# Patient Record
Sex: FEMALE | Race: WHITE | NOT HISPANIC OR LATINO | Employment: FULL TIME | URBAN - NONMETROPOLITAN AREA
[De-identification: names, ages, dates, MRNs, and addresses within clinical notes are randomized per-mention and may not be internally consistent; named-entity substitution may affect disease eponyms.]

---

## 2018-03-23 ENCOUNTER — HOSPITAL ENCOUNTER (OUTPATIENT)
Dept: PHYSICAL THERAPY | Facility: HOSPITAL | Age: 28
Discharge: HOME | End: 2018-03-23

## 2018-03-23 PROCEDURE — 97799 UNLISTED PHYSCL MED/REHAB PX: CPT

## 2018-06-26 ENCOUNTER — OFFICE VISIT (OUTPATIENT)
Dept: FAMILY MEDICINE CLINIC | Facility: CLINIC | Age: 28
End: 2018-06-26

## 2018-06-26 VITALS
RESPIRATION RATE: 18 BRPM | WEIGHT: 178.8 LBS | HEIGHT: 62 IN | TEMPERATURE: 98.5 F | BODY MASS INDEX: 32.9 KG/M2 | OXYGEN SATURATION: 99 % | HEART RATE: 68 BPM | DIASTOLIC BLOOD PRESSURE: 74 MMHG | SYSTOLIC BLOOD PRESSURE: 120 MMHG

## 2018-06-26 DIAGNOSIS — F41.9 ANXIETY: Primary | ICD-10-CM

## 2018-06-26 DIAGNOSIS — Z11.59 ENCOUNTER FOR HEPATITIS C VIRUS SCREENING TEST FOR HIGH RISK PATIENT: ICD-10-CM

## 2018-06-26 DIAGNOSIS — Z91.89 ENCOUNTER FOR HEPATITIS C VIRUS SCREENING TEST FOR HIGH RISK PATIENT: ICD-10-CM

## 2018-06-26 DIAGNOSIS — E66.9 OBESITY (BMI 30-39.9): ICD-10-CM

## 2018-06-26 DIAGNOSIS — Z00.00 ADULT GENERAL MEDICAL EXAM: ICD-10-CM

## 2018-06-26 DIAGNOSIS — Z13.0 SCREENING FOR DEFICIENCY ANEMIA: ICD-10-CM

## 2018-06-26 DIAGNOSIS — Z13.220 LIPID SCREENING: ICD-10-CM

## 2018-06-26 PROCEDURE — 99203 OFFICE O/P NEW LOW 30 MIN: CPT | Performed by: FAMILY MEDICINE

## 2018-06-26 PROCEDURE — 99385 PREV VISIT NEW AGE 18-39: CPT | Performed by: FAMILY MEDICINE

## 2018-06-26 RX ORDER — PAROXETINE HYDROCHLORIDE 20 MG/1
20 TABLET, FILM COATED ORAL EVERY MORNING
Qty: 90 TABLET | Refills: 1 | Status: SHIPPED | OUTPATIENT
Start: 2018-06-26 | End: 2019-05-10 | Stop reason: SDUPTHER

## 2018-06-26 RX ORDER — PAROXETINE HYDROCHLORIDE 20 MG/1
20 TABLET, FILM COATED ORAL EVERY MORNING
COMMUNITY
End: 2018-06-26 | Stop reason: SDUPTHER

## 2018-06-26 NOTE — PATIENT INSTRUCTIONS
Hepatitis C  Hepatitis C is a viral infection of the liver. It can lead to scarring of the liver (cirrhosis), liver failure, or liver cancer. Hepatitis C may go undetected for months or years because people with the infection may not have symptoms, or they may have only mild symptoms.  What are the causes?  This condition is caused by the hepatitis C virus (HCV). The virus can spread from person to person (is contagious) through:  · Blood.  · Childbirth. A woman who has hepatitis C can pass it to her baby during birth.  · Bodily fluids, such as breast milk, tears, semen, vaginal fluids, and saliva.  · Blood transfusions or organ transplants done in the [place] before 1992.    What increases the risk?  The following factors may make you more likely to develop this condition:  · Having contact with unclean (contaminated) needles or syringes. This may result from:  ? Acupuncture.  ? Tattoing.  ? Body piercing.  ? Injecting drugs.  · Having unprotected sex with someone who is infected.  · Needing treatment to filter your blood (kidney dialysis).  · Having HIV (human immunodeficiency virus) or AIDS (acquired immunodeficiency syndrome).  · Working in a job that involves contact with blood or bodily fluids, such as health care.    What are the signs or symptoms?  Symptoms of this condition include:  · Fatigue.  · Loss of appetite.  · Nausea.  · Vomiting.  · Abdominal pain.  · Dark yellow urine.  · Yellowish skin and eyes (jaundice).  · Itchy skin.  · Raj-colored bowel movements.  · Joint pain.  · Bleeding and bruising easily.  · Fluid building up in your stomach (ascites).    In some cases, you may not have any symptoms.  How is this diagnosed?  This condition is diagnosed with:  · Blood tests.  · Other tests to check how well your liver is functioning. They may include:  ? Magnetic resonance elastography (MRE). This imaging test uses MRIs and sound waves to measure liver stiffness.  ? Transient elastography. This  imaging test uses ultrasounds to measure liver stiffness.  ? Liver biopsy. This test requires taking a small tissue sample from your liver to examine it under a microscope.    How is this treated?  Your health care provider may perform noninvasive tests or a liver biopsy to help decide the best course of treatment. Treatment may include:  · Antiviral medicines and other medicines.  · Follow-up treatments every 6-12 months for infections or other liver conditions.  · Receiving a donated liver (liver transplant).    Follow these instructions at home:  Medicines  · Take over-the-counter and prescription medicines only as told by your health care provider.  · Take your antiviral medicine as told by your health care provider. Do not stop taking the antiviral even if you start to feel better.  · Do not take any medicines unless approved by your health care provider, including over-the-counter medicines and birth control pills.  Activity  · Rest as needed.  · Do not have sex unless approved by your health care provider.  · Ask your health care provider when you may return to school or work.  Eating and drinking  · Eat a balanced diet with plenty of fruits and vegetables, whole grains, and lowfat (lean) meats or non-meat proteins (such as beans or tofu).  · Drink enough fluids to keep your urine clear or pale yellow.  · Do not drink alcohol.  General instructions  · Do not share toothbrushes, nail clippers, or razors.  · Wash your hands frequently with soap and water. If soap and water are not available, use hand .  · Cover any cuts or open sores on your skin to prevent spreading the virus.  · Keep all follow-up visits as told by your health care provider. This is important. You may need follow-up visits every 6-12 months.  How is this prevented?  There is no vaccine for hepatitis C. The only way to prevent the disease is to reduce the risk of exposure to the virus. Make sure you:  · Wash your hands frequently with  soap and water. If soap and water are not available, use hand .  · Do not share needles or syringes.  · Practice safe sex and use condoms.  · Avoid handling blood or bodily fluids without gloves or other protection.  · Avoid getting tattoos or piercings in shops or other locations that are not clean.    Contact a health care provider if:  · You have a fever.  · You develop abdominal pain.  · You pass dark urine.  · You pass lawrence-colored stools.  · You develop joint pain.  Get help right away if:  · You have increasing fatigue or weakness.  · You lose your appetite.  · You cannot eat or drink without vomiting.  · You develop jaundice or your jaundice gets worse.  · You bruise or bleed easily.  Summary  · Hepatitis C is a viral infection of the liver. It can lead to scarring of the liver (cirrhosis), liver failure, or liver cancer.  · The hepatitis C virus (HCV) causes this condition. The virus can pass from person to person (is contagious).  · You should not take any medicines unless approved by your health care provider. This includes over-the-counter medicines and birth control pills.  This information is not intended to replace advice given to you by your health care provider. Make sure you discuss any questions you have with your health care provider.  Document Released: 12/15/2001 Document Revised: 01/23/2018 Document Reviewed: 01/23/2018  [employer] Interactive Patient Education © 2018 [employer]    Exercising to Lose Weight  Exercising can help you to lose weight. In order to lose weight through exercise, you need to do vigorous-intensity exercise. You can tell that you are exercising with vigorous intensity if you are breathing very hard and fast and cannot hold a conversation while exercising.  Moderate-intensity exercise helps to maintain your current weight. You can tell that you are exercising at a moderate level if you have a higher heart rate and faster breathing, but you are still able to  hold a conversation.  How often should I exercise?  Choose an activity that you enjoy and set realistic goals. Your health care provider can help you to make an activity plan that works for you. Exercise regularly as directed by your health care provider. This may include:  · Doing resistance training twice each week, such as:  ? Push-ups.  ? Sit-ups.  ? Lifting weights.  ? Using resistance bands.  · Doing a given intensity of exercise for a given amount of time. Choose from these options:  ? 150 minutes of moderate-intensity exercise every week.  ? 75 minutes of vigorous-intensity exercise every week.  ? A mix of moderate-intensity and vigorous-intensity exercise every week.    Children, pregnant women, people who are out of shape, people who are overweight, and older adults may need to consult a health care provider for individual recommendations. If you have any sort of medical condition, be sure to consult your health care provider before starting a new exercise program.  What are some activities that can help me to lose weight?  · Walking at a rate of at least 4.5 miles an hour.  · Jogging or running at a rate of 5 miles per hour.  · Biking at a rate of at least 10 miles per hour.  · Lap swimming.  · Roller-skating or in-line skating.  · Cross-country skiing.  · Vigorous competitive sports, such as football, basketball, and soccer.  · Jumping rope.  · Aerobic dancing.  How can I be more active in my day-to-day activities?  · Use the stairs instead of the elevator.  · Take a walk during your lunch break.  · If you drive, park your car farther away from work or school.  · If you take public transportation, get off one stop early and walk the rest of the way.  · Make all of your phone calls while standing up and walking around.  · Get up, stretch, and walk around every 30 minutes throughout the day.  What guidelines should I follow while exercising?  · Do not exercise so much that you hurt yourself, feel dizzy, or  get very short of breath.  · Consult your health care provider prior to starting a new exercise program.  · Wear comfortable clothes and shoes with good support.  · Drink plenty of water while you exercise to prevent dehydration or heat stroke. Body water is lost during exercise and must be replaced.  · Work out until you breathe faster and your heart beats faster.  This information is not intended to replace advice given to you by your health care provider. Make sure you discuss any questions you have with your health care provider.  Document Released: 01/20/2012 Document Revised: 05/25/2017 Document Reviewed: 05/21/2015  [employer] Interactive Patient Education © 2018 [employer]  Serving Sizes  A serving size is a measured amount of food or drink, such as one slice of bread, that has an associated nutrient content. Knowing the serving size of a food or drink can help you determine how much of that food you should consume.  What is the size of one serving?  The size of one healthy serving depends on the food or drink. To determine a serving size, read the food label. If the food or drink does not have a food label, try to find serving size information online. Or, use the following to estimate the size of one adult serving:  Grain  1 slice bread. ½ bagel. ½ cup pasta.  Vegetable  ½ cup cooked or canned vegetables. 1 cup raw, leafy greens.  Fruit  ½ cup canned fruit. 1 medium fruit. ¼ cup dried fruit.  Meat and Other Protein Sources  1 oz meat, poultry, or fish. ¼ cup cooked beans. 1 egg. ¼ cup nuts or seeds. 1 Tbsp nut butter. ¼ cup tofu or tempeh. 2 Tbsp hummus.  Dairy  An individual container of yogurt (6-8 oz). 1 piece of cheese the size of your thumb (1 oz). 1 cup (8 oz) milk or milk alternative.  Fat  A piece the size of one dice. 1 tsp soft margarine. 1 Tbsp mayonnaise. 1 tsp vegetable oil. 1 Tbsp regular salad dressing. 2 Tbsp low-fat salad dressing.  How many servings should I eat from each food group each  day?  The following are the suggested number of servings to try and have every day from each food group. You can also look at your eating throughout the week and aim for meeting these requirements on most days for overall healthy eating.  Grain  6-8 servings. Try to have half of your grains from whole grains, such as whole wheat bread, corn tortillas, oatmeal, brown rice, whole wheat pasta, and bulgur.  Vegetable  At least 2½-3 servings.  Fruit  2 servings.  Meat and Other Protein Foods  5-6 servings. Aim to have lean proteins, such as chicken, turkey, fish, beans, or tofu.  Dairy  3 servings. Choose low-fat or nonfat if you are trying to control your weight.  Fat  2-3 servings.  Is a serving the same thing as a portion?  No. A portion is the actual amount you eat, which may be more than one serving. Knowing the specific serving size of a food and the nutritional information that goes with it can help you make a healthy decision on what size portion to eat.  What are some tips to help me learn healthy serving sizes?  · Check food labels for serving sizes. Many foods that come as a single portion actually contain multiple servings.  · Determine the serving size of foods you commonly eat and figure out how large a portion you usually eat.  · Measure the number of servings that can be held by the bowls, glasses, cups, and plates you typically use. For example, pour your breakfast cereal into your regular bowl and then pour it into a measuring cup.  · For 1-2 days, measure the serving sizes of all the foods you eat.  · Practice estimating serving sizes and determining how big your portions should be.  This information is not intended to replace advice given to you by your health care provider. Make sure you discuss any questions you have with your health care provider.  Document Released: 09/15/2004 Document Revised: 08/12/2017 Document Reviewed: 03/17/2015  [employer] Interactive Patient Education © 2018 [employer]

## 2018-06-26 NOTE — PROGRESS NOTES
Subjective cc: est care for anxiety   Aparna MORTENSEN is a 27 y.o. female who present for est care for anxiety.  Patient takes paxil for anxiety for the past 3 years. She reports that it is well controlled with medication. She reports it came on suddenly about 1 year after having her child. Her mother has bad anxiety. Can be triggered by everyday stress. Not having any panic attacks currently when on paxil. She is concerned about the ability to be on the medicaiton if she were to become pregnant.   She quit taking her OCP 1 year ago. They have been trying to get pregnant without success. She is not having regular menstrual cycles.     Patient reports her last pap smear was more than 3 years ago. Patient has followed with Dr Santos but has difficulty getting in for an appt so she would like to just have her pap done here.     Her  does have hep C, they follow with GI later this week to have him further evaluated.       Anxiety   Presents for initial visit. Onset was 1 to 5 years ago. The problem has been unchanged. Symptoms include nervous/anxious behavior and panic. Patient reports no chest pain, nausea, palpitations or shortness of breath. Primary symptoms comment: controleld with medication. Symptoms occur rarely. The severity of symptoms is mild. Exacerbated by: everything      Risk factors include family history. Her past medical history is significant for anxiety/panic attacks. Past treatments include SSRIs. The treatment provided significant relief. Compliance with prior treatments has been good.        The following portions of the patient's history were reviewed and updated as appropriate: allergies, current medications, past family history, past medical history, past social history, past surgical history and problem list.        Review of Systems   Constitutional: Negative for activity change and appetite change. Unexpected weight change: weight gain    Respiratory: Negative for cough, chest  "tightness, shortness of breath and wheezing.    Cardiovascular: Negative for chest pain and palpitations.   Gastrointestinal: Negative for abdominal pain, constipation, diarrhea, nausea and vomiting.   Genitourinary: Positive for menstrual problem.   Psychiatric/Behavioral: Negative for dysphoric mood. The patient is nervous/anxious.        Objective   Blood pressure 120/74, pulse 68, temperature 98.5 °F (36.9 °C), temperature source Oral, resp. rate 18, height 157.5 cm (62\"), weight 81.1 kg (178 lb 12.8 oz), last menstrual period 05/28/2018, SpO2 99 %, not currently breastfeeding.  Physical Exam   Constitutional: She is oriented to person, place, and time. She appears well-developed and well-nourished. No distress.   HENT:   Head: Normocephalic and atraumatic.   Right Ear: External ear normal.   Left Ear: External ear normal.   Nose: Nose normal.   Mouth/Throat: Oropharynx is clear and moist. No oropharyngeal exudate.   Eyes: Conjunctivae and EOM are normal. Right eye exhibits no discharge. Left eye exhibits no discharge. No scleral icterus.   Neck: Normal range of motion. No tracheal deviation present. No thyromegaly present.   Cardiovascular: Normal rate, regular rhythm, normal heart sounds and intact distal pulses.    No murmur heard.  Pulmonary/Chest: Effort normal and breath sounds normal. No stridor. No respiratory distress. She has no wheezes. She exhibits no tenderness.   Abdominal: Soft. She exhibits no distension. There is no tenderness.   Musculoskeletal: She exhibits no edema.   Lymphadenopathy:     She has no cervical adenopathy.   Neurological: She is alert and oriented to person, place, and time. She exhibits normal muscle tone. Coordination normal.   Skin: Skin is warm and dry. She is not diaphoretic.   Psychiatric: She has a normal mood and affect. Her behavior is normal. Judgment and thought content normal.   Nursing note and vitals reviewed.      Assessment/Plan   Problems Addressed this Visit  "    None      Visit Diagnoses     Anxiety    -  Primary    Relevant Medications    PARoxetine (PAXIL) 20 MG tablet    Other Relevant Orders    Comprehensive metabolic panel    TSH    Adult general medical exam        Relevant Orders    Comprehensive metabolic panel    Encounter for hepatitis C virus screening test for high risk patient        Relevant Orders    Comprehensive metabolic panel    Hepatitis C antibody    Screening for deficiency anemia        Relevant Orders    CBC No Differential    Lipid screening        Relevant Orders    Lipid panel    Obesity (BMI 30-39.9)        Relevant Orders    Lipid panel        PLAN:     #1 anxiety: Chronic, controlled.  Patient currently taking Paxil daily.  Refill given on this prescription.  Did discuss with patient that Paxil is contraindicated during pregnancy.  Discussed changing medication versus discussing with her OB/GYN.  Patient will discuss with OB prior to changing medication.  She is hesitant to change medication because it is working so well for her.  Discussed changing to Zoloft.  Patient will allow me know after she has spoke with her OB.    #2 obesity: Patient's Body mass index is 32.7 kg/m². BMI is above normal parameters. Recommendations include: exercise counseling and nutrition counseling.    #3 irregular menstrual cycles: This could be contributing to patient's difficulty becoming pregnant.  Patient has been off of her oral contraceptives for 1 year.  Discussed how weight loss can help to regulate cycles.  Patient to work on changes to her diet and increased exercise over the next one month.  We will reevaluate her weight in 1 month.  Discussed trial of metformin at that time if not improved.    #4 screening: Will get labs today, we will screen for hepatitis C because of the exposure to her  being hep C positive.  Patient to return for Pap smear.  Recommended flu shot in the fall.  Patient has had her hepatitis A and B vaccines.  Patient would  have had her Tdap with her previous pregnancy.          This document has been electronically signed by Cristy Butler MD on June 26, 2018 10:07 AM

## 2018-06-27 LAB
ALBUMIN SERPL-MCNC: 4.3 G/DL (ref 3.5–5)
ALBUMIN/GLOB SERPL: 1.5 G/DL (ref 1.1–2.5)
ALP SERPL-CCNC: 53 U/L (ref 24–120)
ALT SERPL-CCNC: 28 U/L (ref 0–54)
AST SERPL-CCNC: 21 U/L (ref 7–45)
BILIRUB SERPL-MCNC: 0.7 MG/DL (ref 0.1–1)
BUN SERPL-MCNC: 11 MG/DL (ref 5–21)
BUN/CREAT SERPL: 15.3 (ref 7–25)
CALCIUM SERPL-MCNC: 9.7 MG/DL (ref 8.4–10.4)
CHLORIDE SERPL-SCNC: 103 MMOL/L (ref 98–110)
CHOLEST SERPL-MCNC: 173 MG/DL (ref 130–200)
CO2 SERPL-SCNC: 27 MMOL/L (ref 24–31)
CREAT SERPL-MCNC: 0.72 MG/DL (ref 0.5–1.4)
ERYTHROCYTE [DISTWIDTH] IN BLOOD BY AUTOMATED COUNT: 12.9 % (ref 12–15)
GLOBULIN SER CALC-MCNC: 2.9 GM/DL
GLUCOSE SERPL-MCNC: 93 MG/DL (ref 70–100)
HCT VFR BLD AUTO: 37.3 % (ref 37–47)
HCV AB S/CO SERPL IA: 6.3 S/CO RATIO (ref 0–0.9)
HDLC SERPL-MCNC: 55 MG/DL
HGB BLD-MCNC: 12.2 G/DL (ref 12–16)
LDLC SERPL CALC-MCNC: 103 MG/DL (ref 0–99)
MCH RBC QN AUTO: 29.3 PG (ref 28–32)
MCHC RBC AUTO-ENTMCNC: 32.7 G/DL (ref 33–36)
MCV RBC AUTO: 89.7 FL (ref 82–98)
PLATELET # BLD AUTO: 239 10*3/MM3 (ref 130–400)
POTASSIUM SERPL-SCNC: 4.3 MMOL/L (ref 3.5–5.3)
PROT SERPL-MCNC: 7.2 G/DL (ref 6.3–8.7)
RBC # BLD AUTO: 4.16 10*6/MM3 (ref 4.2–5.4)
SODIUM SERPL-SCNC: 141 MMOL/L (ref 135–145)
TRIGL SERPL-MCNC: 77 MG/DL (ref 0–149)
TSH SERPL DL<=0.005 MIU/L-ACNC: 1.69 MIU/ML (ref 0.47–4.68)
VLDLC SERPL CALC-MCNC: 15.4 MG/DL
WBC # BLD AUTO: 6.05 10*3/MM3 (ref 4.8–10.8)

## 2018-06-28 DIAGNOSIS — R76.8 POSITIVE HEPATITIS C ANTIBODY TEST: Primary | ICD-10-CM

## 2018-07-03 ENCOUNTER — RESULTS ENCOUNTER (OUTPATIENT)
Dept: FAMILY MEDICINE CLINIC | Facility: CLINIC | Age: 28
End: 2018-07-03

## 2018-07-03 DIAGNOSIS — R76.8 POSITIVE HEPATITIS C ANTIBODY TEST: ICD-10-CM

## 2019-03-29 ENCOUNTER — OFFICE VISIT (OUTPATIENT)
Dept: FAMILY MEDICINE CLINIC | Facility: CLINIC | Age: 29
End: 2019-03-29

## 2019-03-29 VITALS
BODY MASS INDEX: 31.1 KG/M2 | OXYGEN SATURATION: 99 % | SYSTOLIC BLOOD PRESSURE: 108 MMHG | DIASTOLIC BLOOD PRESSURE: 64 MMHG | WEIGHT: 169 LBS | TEMPERATURE: 98.4 F | HEART RATE: 86 BPM | RESPIRATION RATE: 18 BRPM | HEIGHT: 62 IN

## 2019-03-29 DIAGNOSIS — B34.9 VIRAL SYNDROME: Primary | ICD-10-CM

## 2019-03-29 DIAGNOSIS — R50.9 FEVER, UNSPECIFIED FEVER CAUSE: ICD-10-CM

## 2019-03-29 LAB
EXPIRATION DATE: NORMAL
FLUAV AG NPH QL: NEGATIVE
FLUBV AG NPH QL: NEGATIVE
INTERNAL CONTROL: NORMAL
Lab: NORMAL

## 2019-03-29 PROCEDURE — 87804 INFLUENZA ASSAY W/OPTIC: CPT | Performed by: FAMILY MEDICINE

## 2019-03-29 PROCEDURE — 99213 OFFICE O/P EST LOW 20 MIN: CPT | Performed by: FAMILY MEDICINE

## 2019-03-29 RX ORDER — ONDANSETRON 4 MG/1
4 TABLET, FILM COATED ORAL EVERY 6 HOURS PRN
Qty: 10 TABLET | Refills: 0 | Status: SHIPPED | OUTPATIENT
Start: 2019-03-29 | End: 2019-05-10

## 2019-03-29 NOTE — PROGRESS NOTES
"Subjective cc: fever  Aparna Valenzuela is a 28 y.o. female who presents with complaint of fever, body aches, chills, sore throat and difficulty swallowing, decreased PO intake, nausea, no vomiting, no cough, no diarrhea, headache.      with influenza.     Fever    This is a new problem. The current episode started in the past 7 days. The problem occurs daily. The problem has been unchanged. The maximum temperature noted was 103 to 103.9 F. Associated symptoms include congestion, headaches, muscle aches and nausea. Pertinent negatives include no abdominal pain, chest pain, coughing, diarrhea, rash, sleepiness, vomiting or wheezing. She has tried NSAIDs for the symptoms. The treatment provided no relief.   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        Review of Systems   Constitutional: Positive for activity change, chills, fatigue and fever.   HENT: Positive for congestion, rhinorrhea and trouble swallowing.    Respiratory: Negative for cough, shortness of breath and wheezing.    Cardiovascular: Negative for chest pain.   Gastrointestinal: Positive for nausea. Negative for abdominal pain, blood in stool, constipation, diarrhea and vomiting.   Skin: Negative for rash.   Neurological: Positive for headaches.   All other systems reviewed and are negative.      Objective   Blood pressure 108/64, pulse 86, temperature 98.4 °F (36.9 °C), temperature source Oral, resp. rate 18, height 157.5 cm (62.01\"), weight 76.7 kg (169 lb), SpO2 99 %, not currently breastfeeding.  Physical Exam   Constitutional: She is oriented to person, place, and time. She appears well-developed and well-nourished. She is active and cooperative.  " Non-toxic appearance. She has a sickly appearance. No distress.   HENT:   Head: Normocephalic and atraumatic.   Right Ear: Hearing, tympanic membrane, external ear and ear canal normal.   Left Ear: Hearing, tympanic membrane, external ear and ear canal normal.   Nose: Nose normal. Right sinus exhibits no maxillary sinus tenderness and no frontal sinus tenderness. Left sinus exhibits no maxillary sinus tenderness and no frontal sinus tenderness.   Mouth/Throat: Oropharynx is clear and moist. Mucous membranes are dry. No oropharyngeal exudate.   Eyes: Conjunctivae and EOM are normal. Right eye exhibits no discharge. Left eye exhibits no discharge.   Neck: Normal range of motion. No tracheal deviation present. No thyromegaly present.   Cardiovascular: Normal rate, regular rhythm, normal heart sounds and intact distal pulses.   Pulmonary/Chest: Effort normal and breath sounds normal. No stridor. No respiratory distress. She has no wheezes. She exhibits no tenderness.   Musculoskeletal: She exhibits no edema.   Lymphadenopathy:     She has no cervical adenopathy.   Neurological: She is alert and oriented to person, place, and time. She exhibits normal muscle tone. Coordination normal.   Skin: Skin is warm and dry. She is not diaphoretic.   Psychiatric: She has a normal mood and affect. Her behavior is normal. Judgment and thought content normal.   Nursing note and vitals reviewed.    Lab Results (last 24 hours)     Procedure Component Value Units Date/Time    POCT Influenza A/B [[id number]]  (Normal) Collected:  03/29/19 1615    Specimen:  Swab Updated:  03/29/19 1618     Rapid Influenza A Ag Negative     Rapid Influenza B Ag Negative     Internal Control Passed     Lot Number [id number]     Expiration Date 9,655,259          Assessment/Plan   Problems Addressed this Visit     None      Visit Diagnoses     Viral syndrome    -  Primary    Relevant Medications    ondansetron (ZOFRAN) 4 MG tablet    Fever, unspecified fever  cause        Relevant Orders    POCT Influenza A/B (Completed)          PLAN:     #1 viral: new, flu negative, advise don conservative measures, advised on warning signs, return if not improving           This document has been electronically signed by Cristy Butler MD on March 29, 2019 4:35 PM

## 2019-04-26 DIAGNOSIS — F41.9 ANXIETY: ICD-10-CM

## 2019-04-26 RX ORDER — PAROXETINE HYDROCHLORIDE 20 MG/1
TABLET, FILM COATED ORAL
Qty: 90 TABLET | Refills: 1 | OUTPATIENT
Start: 2019-04-26

## 2019-05-10 ENCOUNTER — OFFICE VISIT (OUTPATIENT)
Dept: FAMILY MEDICINE CLINIC | Facility: CLINIC | Age: 29
End: 2019-05-10

## 2019-05-10 VITALS
BODY MASS INDEX: 30.8 KG/M2 | TEMPERATURE: 98.5 F | DIASTOLIC BLOOD PRESSURE: 71 MMHG | SYSTOLIC BLOOD PRESSURE: 106 MMHG | OXYGEN SATURATION: 99 % | WEIGHT: 167.4 LBS | HEART RATE: 79 BPM | RESPIRATION RATE: 18 BRPM | HEIGHT: 62 IN

## 2019-05-10 DIAGNOSIS — F33.0 MILD EPISODE OF RECURRENT MAJOR DEPRESSIVE DISORDER (HCC): Primary | ICD-10-CM

## 2019-05-10 DIAGNOSIS — F41.9 ANXIETY: ICD-10-CM

## 2019-05-10 PROCEDURE — 99213 OFFICE O/P EST LOW 20 MIN: CPT | Performed by: NURSE PRACTITIONER

## 2019-05-10 RX ORDER — PAROXETINE HYDROCHLORIDE 20 MG/1
20 TABLET, FILM COATED ORAL EVERY MORNING
Qty: 90 TABLET | Refills: 1 | Status: SHIPPED | OUTPATIENT
Start: 2019-05-10 | End: 2020-05-05 | Stop reason: SDUPTHER

## 2019-05-10 NOTE — PROGRESS NOTES
"Chief Complaint   Patient presents with   • Anxiety     Pt is here for followup and medication refills.          Subjective   Aparna Valenzuela is a 28 y.o. female here for follow up for depression and anxiety.  I started her on paxil and she says it is working very well and she loves it.  Denies any suicidal or homicidal ideations.       The following portions of the patient's history were reviewed and updated as appropriate: allergies, current medications, past family history, past medical history, past social history, past surgical history and problem list.      Current Outpatient Medications:   •  PARoxetine (PAXIL) 20 MG tablet, Take 1 tablet by mouth Every Morning., Disp: 90 tablet, Rfl: 1  No Known Allergies  Past Medical History:   Diagnosis Date   • Anxiety      Past Surgical History:   Procedure Laterality Date   • APPENDECTOMY       Family History   Problem Relation Age of Onset   • Hyperlipidemia Mother    • Hypertension Mother    • No Known Problems Father    • No Known Problems Sister    • Hypertension Brother    • Cancer Maternal Grandmother    • Colon cancer Maternal Grandmother    • Heart disease Paternal Grandfather        REVIEW OF SYMPTOMS: (Positives bolded)  All negative today  General:  weight loss, fever, chills, night sweats, fatigue, appetite loss  HEENT:  blurry vision, eye pain, eye discharge, dry eyes, decreased vision  Respiratory: shortness of breath, cough, hemoptysis, wheezing, pleurisy,   Cardiovascular:  chest pain, PND, palpitation, edema, orthopnea, syncope  Gastro: Nausea, vomiting, diarrhea, hematemesis, abdominal pain, constipation  Genito: hematuria, dysuria, glycosuria, hesitancy, frequency, incontinence  Musckelo: Arthralgia, myalgia, muscle weakness, joint swelling, NSAID use  Skin: rash, pruritis, sores, nail changes, skin thickening, change in wart/mole, itching   Neuro:  Migraine, numbness, ataxia, tremor, vertigo, weakness, memory loss  \"All other systems reviewed and " negative, except as listed above.”      OBJECTIVE:  Constitutional:  No acute distress, Consistent with stated age. Oriented x 3, alert Posture. Patient is pleasant and cooperative with the interview and exam.    Integumentary: No rashes, ulcers or lesions. No edema.  Normal skin moisture/turgor. Skin is warm to touch, no increased warmth. Capillary refill is normal bilateral Upper and lower extremity.     Eye: Bilaterally PERRLA, EOMI.  No discharge.  Upper and lower eyelids are normal. Sclera/conjunctiva normal without discharge. Cornea is normal and clear. Lens is normal.  Eyeball appears normal. No ciliary flushing, no conjunctival injection.    ENMT:  Canals  normal without erythema or discharge, no excessive cerumen. Tympanic membranes Grey/pearly, normal light reflex and anatomy. Hearing normal to conversational speech at 2-5 feet. Nares airflow, no discharge. Dentition assessed and discussed appropriate oral care. Tongue normal midline.  no pharyngeal erythema, Uvula midline. No post nasal drip.     CHEST/LUNG:Lungs clear throughout lung fields without rale, rhonchi or wheezes.  Normal effort, no distress, no use of accessory muscles. nontender sternum, ribline.  No abnormal pulsations.      CARDIOVASCULAR:  Regular rate and rhythm. No murmur noted in sitting, supine positions. digital clubbing, cyanosis, edema, increased warmth.  normal, no bruits or abnormal pulsations.      ABDOMEN: normal and no visible pulsations. Normal contour. Bowel sounds normal, no abdominal bruits. Abd soft, non-tender, no rebound tenderness, no rigidity (guarding), no jar tenderness, no masses.  no hepatomegaly, no splenomegaly     Neuropsych: Able to articulate well. Normal speech, normal rate, normal tone, normal use of language, volume and coherence.  Thought- normal with ability to perform basic computations and apply abstract thought/reason. No hallucinations, delusions, obsessions.   Appropriate judgement and  "memory.      /71 (BP Location: Left arm, Patient Position: Sitting, Cuff Size: Adult)   Pulse 79   Temp 98.5 °F (36.9 °C) (Oral)   Resp 18   Ht 157.5 cm (62.01\")   Wt 75.9 kg (167 lb 6.4 oz)   SpO2 99%   Breastfeeding? No   BMI 30.61 kg/m²     ASSESSMENT  Aparna was seen today for anxiety.    Diagnoses and all orders for this visit:    Mild episode of recurrent major depressive disorder (CMS/HCC)    Anxiety  -     PARoxetine (PAXIL) 20 MG tablet; Take 1 tablet by mouth Every Morning.           PHQ-9 Depression Screening  Little interest or pleasure in doing things? 0   Feeling down, depressed, or hopeless? 0   Trouble falling or staying asleep, or sleeping too much? 0   Feeling tired or having little energy? 0   Poor appetite or overeating? 0   Feeling bad about yourself - or that you are a failure or have let yourself or your family down? 0   Trouble concentrating on things, such as reading the newspaper or watching television? 1   Moving or speaking so slowly that other people could have noticed? Or the opposite - being so fidgety or restless that you have been moving around a lot more than usual? 0   Thoughts that you would be better off dead, or of hurting yourself in some way? 0   PHQ-9 Total Score 1   If you checked off any problems, how difficult have these problems made it for you to do your work, take care of things at home, or get along with other people? Not difficult at all       Return in about 6 months (around 11/10/2019) for Recheck depression and anxiety.    Belkis Robles, DNP, APRN-BC   05/10/2019    "

## 2019-05-10 NOTE — PATIENT INSTRUCTIONS
Depression Screening  Depression screening is a tool that your health care provider can use to learn if you have symptoms of depression. Depression is a common condition with many symptoms that are also often found in other conditions. Depression is treatable, but it must first be diagnosed. You may not know that certain feelings, thoughts, and behaviors that you are having can be symptoms of depression. Taking a depression screening test can help you and your health care provider decide if you need more assessment, or if you should be referred to a mental health care provider.  What are the screening tests?  · You may have a physical exam to see if another condition is affecting your mental health. You may have a blood or urine sample taken during the physical exam.  · You may be interviewed using a screening tool that was developed from research, such as one of these:  ? Patient Health Questionnaire (PHQ). This is a set of either 2 or 9 questions. A health care provider who has been trained to score this screening test uses a guide to assess if your symptoms suggest that you may have depression.  ? Hardy Depression Rating Scale (HAM-D). This is a set of either 17 or 24 questions. You may be asked to take it again during or after your treatment, to see if your depression has gotten better.  ? Ruvalcaba Depression Inventory (BDI). This is a set of 21 multiple choice questions. Your health care provider scores your answers to assess:  § Your level of depression, ranging from mild to severe.  § Your response to treatment.  · Your health care provider may talk with you about your daily activities, such as eating, sleeping, work, and recreation, and ask if you have had any changes in activity.  · Your health care provider may ask you to see a mental health specialist, such as a psychiatrist or psychologist, for more evaluation.  Who should be screened for depression?  · All adults, including adults with a family history  of a mental health disorder.  · Adolescents who are 12-18 years old.  · People who are recovering from a myocardial infarction (MI).  · Pregnant women, or women who have given birth.  · People who have a long-term (chronic) illness.  · Anyone who has been diagnosed with another type of a mental health disorder.  · Anyone who has symptoms that could show depression.  What do my results mean?  Your health care provider will review the results of your depression screening, physical exam, and lab tests. Positive screens suggest that you may have depression. Screening is the first step in getting the care that you may need. It is up to you to get your screening results. Ask your health care provider, or the department that is doing your screening tests, when your results will be ready. Talk with your health care provider about your results and diagnosis.  A diagnosis of depression is made using the Diagnostic and Statistical Manual of Mental Disorders (DSM-V). This is a book that lists the number and type of symptoms that must be present for a health care provider to give a specific diagnosis.   Your health care provider may work with you to treat your symptoms of depression, or your health care provider may help you find a mental health provider who can assess, diagnose, and treat your depression.  Get help right away if:  · You have thoughts about hurting yourself or others.  If you ever feel like you may hurt yourself or others, or have thoughts about taking your own life, get help right away. You can go to your nearest emergency department or call:  · Your local emergency services (911 in the [place]  · A suicide crisis helpline, such as the National Suicide Prevention Lifeline at 1-882.913.4679. This is open 24 hours a day.    Summary  · Depression screening is the first step in getting the help that you may need.  · If your screening test shows symptoms of depression (is positive), your health care provider may ask  you to see a mental health provider.  · Anyone who is age 12 or older should be screened for depression.  This information is not intended to replace advice given to you by your health care provider. Make sure you discuss any questions you have with your health care provider.  Document Released: 05/04/2018 Document Revised: 05/04/2018 Document Reviewed: 05/04/2018  E[employer]! Interactive Patient Education © 2019 [employer]

## 2019-05-16 ENCOUNTER — TELEPHONE (OUTPATIENT)
Dept: FAMILY MEDICINE CLINIC | Facility: CLINIC | Age: 29
End: 2019-05-16

## 2019-05-16 NOTE — TELEPHONE ENCOUNTER
Fax received for [facility] papers to be filled out. Pt reports the papers need to be filled out from last June when she was diagnosed with hepatatis. Provider will not fill papers out because she did not put pt off work. Pt notified.

## 2020-01-23 DIAGNOSIS — F41.9 ANXIETY: ICD-10-CM

## 2020-01-23 RX ORDER — PAROXETINE HYDROCHLORIDE 20 MG/1
TABLET, FILM COATED ORAL
Qty: 90 TABLET | Refills: 1 | OUTPATIENT
Start: 2020-01-23

## 2020-04-29 DIAGNOSIS — F41.9 ANXIETY: ICD-10-CM

## 2020-05-05 ENCOUNTER — OFFICE VISIT (OUTPATIENT)
Dept: FAMILY MEDICINE CLINIC | Facility: CLINIC | Age: 30
End: 2020-05-05

## 2020-05-05 VITALS
RESPIRATION RATE: 16 BRPM | BODY MASS INDEX: 30.36 KG/M2 | HEART RATE: 81 BPM | OXYGEN SATURATION: 96 % | TEMPERATURE: 97.9 F | DIASTOLIC BLOOD PRESSURE: 64 MMHG | SYSTOLIC BLOOD PRESSURE: 102 MMHG | HEIGHT: 62 IN | WEIGHT: 165 LBS

## 2020-05-05 DIAGNOSIS — F41.9 ANXIETY: Primary | ICD-10-CM

## 2020-05-05 DIAGNOSIS — E66.09 CLASS 1 OBESITY DUE TO EXCESS CALORIES WITHOUT SERIOUS COMORBIDITY WITH BODY MASS INDEX (BMI) OF 30.0 TO 30.9 IN ADULT: ICD-10-CM

## 2020-05-05 DIAGNOSIS — Z13.0 SCREENING FOR DEFICIENCY ANEMIA: ICD-10-CM

## 2020-05-05 DIAGNOSIS — Z13.1 DIABETES MELLITUS SCREENING: ICD-10-CM

## 2020-05-05 DIAGNOSIS — R76.8 POSITIVE HEPATITIS C ANTIBODY TEST: ICD-10-CM

## 2020-05-05 DIAGNOSIS — Z12.4 CERVICAL CANCER SCREENING: ICD-10-CM

## 2020-05-05 DIAGNOSIS — Z13.220 LIPID SCREENING: ICD-10-CM

## 2020-05-05 PROCEDURE — 99213 OFFICE O/P EST LOW 20 MIN: CPT | Performed by: FAMILY MEDICINE

## 2020-05-05 RX ORDER — PAROXETINE HYDROCHLORIDE 20 MG/1
TABLET, FILM COATED ORAL
Qty: 90 TABLET | Refills: 1 | OUTPATIENT
Start: 2020-05-05

## 2020-05-05 RX ORDER — PAROXETINE HYDROCHLORIDE 20 MG/1
20 TABLET, FILM COATED ORAL EVERY MORNING
Qty: 90 TABLET | Refills: 1 | Status: SHIPPED | OUTPATIENT
Start: 2020-05-05

## 2020-05-27 ENCOUNTER — TELEPHONE (OUTPATIENT)
Dept: BARIATRICS/WEIGHT MGMT | Facility: CLINIC | Age: 30
End: 2020-05-27

## 2020-06-02 ENCOUNTER — TELEPHONE (OUTPATIENT)
Dept: BARIATRICS/WEIGHT MGMT | Facility: CLINIC | Age: 30
End: 2020-06-02

## 2020-06-03 ENCOUNTER — TELEPHONE (OUTPATIENT)
Dept: BARIATRICS/WEIGHT MGMT | Facility: CLINIC | Age: 30
End: 2020-06-03

## 2020-06-03 NOTE — TELEPHONE ENCOUNTER
Attempted to contact patient regarding her no show for today's appointment. Left voicemail for patient to call office.

## 2020-07-21 ENCOUNTER — HOSPITAL ENCOUNTER (EMERGENCY)
Facility: HOSPITAL | Age: 30
Discharge: HOME | End: 2020-07-21
Admitting: EMERGENCY MEDICINE

## 2020-07-21 ENCOUNTER — APPOINTMENT (OUTPATIENT)
Dept: GENERAL RADIOLOGY | Facility: HOSPITAL | Age: 30
End: 2020-07-21

## 2020-07-21 VITALS
TEMPERATURE: 97.8 F | OXYGEN SATURATION: 98 % | BODY MASS INDEX: 28.35 KG/M2 | SYSTOLIC BLOOD PRESSURE: 130 MMHG | HEIGHT: 63 IN | HEART RATE: 66 BPM | DIASTOLIC BLOOD PRESSURE: 87 MMHG | WEIGHT: 160 LBS | RESPIRATION RATE: 14 BRPM

## 2020-07-21 DIAGNOSIS — Z20.822 COVID-19 RULED OUT: Primary | ICD-10-CM

## 2020-07-21 LAB
S PYO AG THROAT QL: NEGATIVE
SARS-COV-2 RNA RESP QL NAA+PROBE: NOT DETECTED

## 2020-07-21 PROCEDURE — 87635 SARS-COV-2 COVID-19 AMP PRB: CPT | Performed by: NURSE PRACTITIONER

## 2020-07-21 PROCEDURE — 87081 CULTURE SCREEN ONLY: CPT | Performed by: NURSE PRACTITIONER

## 2020-07-21 PROCEDURE — 99283 EMERGENCY DEPT VISIT LOW MDM: CPT

## 2020-07-21 PROCEDURE — 71045 X-RAY EXAM CHEST 1 VIEW: CPT

## 2020-07-21 PROCEDURE — 87880 STREP A ASSAY W/OPTIC: CPT | Performed by: NURSE PRACTITIONER

## 2020-07-21 RX ORDER — CETIRIZINE HYDROCHLORIDE, PSEUDOEPHEDRINE HYDROCHLORIDE 5; 120 MG/1; MG/1
1 TABLET, FILM COATED, EXTENDED RELEASE ORAL 2 TIMES DAILY
Qty: 15 TABLET | Refills: 0 | Status: SHIPPED | OUTPATIENT
Start: 2020-07-21 | End: 2023-01-17

## 2020-07-21 RX ORDER — BENZONATATE 200 MG/1
200 CAPSULE ORAL 3 TIMES DAILY PRN
Qty: 15 CAPSULE | Refills: 0 | Status: SHIPPED | OUTPATIENT
Start: 2020-07-21 | End: 2023-01-17

## 2020-07-21 NOTE — ED PROVIDER NOTES
Subjective   sa      Other   Severity:  Mild  Chronicity:  New  Context:  Sore throat, cough with congestion, body aches, fever  Associated symptoms: congestion, cough, fever, myalgias and sore throat    Associated symptoms: no abdominal pain, no chest pain, no diarrhea, no ear pain, no nausea, no shortness of breath and no vomiting        Review of Systems   Constitutional: Positive for fever.   HENT: Positive for congestion and sore throat. Negative for ear pain.    Respiratory: Positive for cough. Negative for shortness of breath.    Cardiovascular: Negative.  Negative for chest pain.   Gastrointestinal: Negative.  Negative for abdominal pain, diarrhea, nausea and vomiting.   Musculoskeletal: Positive for myalgias.   Skin: Negative.    All other systems reviewed and are negative.      Past Medical History:   Diagnosis Date   • Anxiety        No Known Allergies    Past Surgical History:   Procedure Laterality Date   • APPENDECTOMY         Family History   Problem Relation Age of Onset   • Hyperlipidemia Mother    • Hypertension Mother    • No Known Problems Father    • No Known Problems Sister    • Hypertension Brother    • Cancer Maternal Grandmother    • Colon cancer Maternal Grandmother    • Heart disease Paternal Grandfather        Social History     Socioeconomic History   • Marital status:      Spouse name: Not on file   • Number of children: Not on file   • Years of education: Not on file   • Highest education level: Not on file   Tobacco Use   • Smoking status: Never Smoker   • Smokeless tobacco: Never Used   Substance and Sexual Activity   • Alcohol use: No   • Drug use: No   • Sexual activity: Defer           Objective   Physical Exam   Constitutional: She is oriented to person, place, and time. She appears well-developed and well-nourished.   HENT:   Head: Normocephalic and atraumatic.   Nose: Nose normal.   Mouth/Throat: Uvula is midline, oropharynx is clear and moist and mucous membranes are  normal. Tonsils are 0 on the right. Tonsils are 0 on the left.   Eyes: Pupils are equal, round, and reactive to light. Conjunctivae and EOM are normal.   Neck: Normal range of motion. Neck supple.   Cardiovascular: Normal rate, regular rhythm, normal heart sounds and intact distal pulses.   Pulmonary/Chest: Effort normal and breath sounds normal.   Abdominal: Soft. Bowel sounds are normal.   Musculoskeletal: Normal range of motion.   Neurological: She is alert and oriented to person, place, and time.   Skin: Skin is warm and dry. Capillary refill takes less than 2 seconds.   Psychiatric: She has a normal mood and affect. Her behavior is normal.   Nursing note and vitals reviewed.      Procedures           ED Course                                           MDM  Number of Diagnoses or Management Options  COVID-19 ruled out: new and requires workup     Amount and/or Complexity of Data Reviewed  Clinical lab tests: ordered and reviewed  Tests in the radiology section of CPT®: ordered and reviewed  Discuss the patient with other providers: yes    Risk of Complications, Morbidity, and/or Mortality  Presenting problems: low  Diagnostic procedures: low  Management options: low    Patient Progress  Patient progress: improved      Final diagnoses:   COVID-19 ruled out            Meghana Don, APRN  07/21/20 [id number]

## 2020-07-22 ENCOUNTER — EPISODE CHANGES (OUTPATIENT)
Dept: CASE MANAGEMENT | Facility: OTHER | Age: 30
End: 2020-07-22

## 2020-07-23 ENCOUNTER — EPISODE CHANGES (OUTPATIENT)
Dept: CASE MANAGEMENT | Facility: OTHER | Age: 30
End: 2020-07-23

## 2020-07-23 LAB — BACTERIA SPEC AEROBE CULT: NORMAL

## 2020-07-27 ENCOUNTER — EPISODE CHANGES (OUTPATIENT)
Dept: CASE MANAGEMENT | Facility: OTHER | Age: 30
End: 2020-07-27

## 2020-07-29 ENCOUNTER — EPISODE CHANGES (OUTPATIENT)
Dept: CASE MANAGEMENT | Facility: OTHER | Age: 30
End: 2020-07-29

## 2020-08-05 ENCOUNTER — EPISODE CHANGES (OUTPATIENT)
Dept: CASE MANAGEMENT | Facility: OTHER | Age: 30
End: 2020-08-05

## 2020-09-04 ENCOUNTER — EPISODE CHANGES (OUTPATIENT)
Dept: CASE MANAGEMENT | Facility: OTHER | Age: 30
End: 2020-09-04

## 2023-01-16 ENCOUNTER — TELEPHONE (OUTPATIENT)
Dept: BARIATRICS/WEIGHT MGMT | Facility: CLINIC | Age: 33
End: 2023-01-16
Payer: COMMERCIAL

## 2023-01-16 NOTE — TELEPHONE ENCOUNTER
LVM to remind them of their new patient appointment, to bring completed paperwork, sign up for [id number]. Please arrive 60 minutes early if these aren't completed. Please call back with any questions [phone]. Also this 1st appt may last up to 2 hrs due to meeting with 2 different providers.         Patient has completed her paperwork and we have it. I did lvm to check her email for the link to [employer] and to complete it before her appt.

## 2023-01-17 ENCOUNTER — OFFICE VISIT (OUTPATIENT)
Dept: BARIATRICS/WEIGHT MGMT | Facility: CLINIC | Age: 33
End: 2023-01-17
Payer: COMMERCIAL

## 2023-01-17 ENCOUNTER — PATIENT ROUNDING (BHMG ONLY) (OUTPATIENT)
Dept: BARIATRICS/WEIGHT MGMT | Facility: CLINIC | Age: 33
End: 2023-01-17
Payer: COMMERCIAL

## 2023-01-17 VITALS
OXYGEN SATURATION: 97 % | HEART RATE: 74 BPM | DIASTOLIC BLOOD PRESSURE: 73 MMHG | TEMPERATURE: 97.6 F | BODY MASS INDEX: 35.07 KG/M2 | WEIGHT: 190.6 LBS | SYSTOLIC BLOOD PRESSURE: 109 MMHG | HEIGHT: 62 IN

## 2023-01-17 DIAGNOSIS — E66.09 CLASS 1 OBESITY DUE TO EXCESS CALORIES WITHOUT SERIOUS COMORBIDITY WITH BODY MASS INDEX (BMI) OF 34.0 TO 34.9 IN ADULT: Primary | ICD-10-CM

## 2023-01-17 PROBLEM — E66.811 CLASS 1 OBESITY DUE TO EXCESS CALORIES WITHOUT SERIOUS COMORBIDITY WITH BODY MASS INDEX (BMI) OF 34.0 TO 34.9 IN ADULT: Status: ACTIVE | Noted: 2023-01-17

## 2023-01-17 PROCEDURE — 99204 OFFICE O/P NEW MOD 45 MIN: CPT | Performed by: NURSE PRACTITIONER

## 2023-01-17 NOTE — ASSESSMENT & PLAN NOTE
Patient's (Body mass index is 34.58 kg/m².) indicates that they are obese (BMI >30) with health conditions that include none . Weight is worsening. BMI is is above average; BMI management plan is completed. We discussed portion control and increasing exercise.

## 2023-01-17 NOTE — PROGRESS NOTES
January 17, 2023    Hello, may I speak with Aparna Valenzuela?    My name is Silvia      I am  with [facility]  [address], [address]  [place] [address] [phone].    Before we get started may I verify your date of birth? 1990    I am calling to officially welcome you to our practice and ask about your recent visit. Is this a good time to talk? YES    Tell me about your visit with us. What things went well?  everything went good       We're always looking for ways to make our patients' experiences even better. Do you have recommendations on ways we may improve? nothing at everything was perfect    Overall were you satisfied with your first visit to our practice? yes       I appreciate you taking the time to speak with me today. Is there anything else I can do for you? no      Thank you, and have a great day.

## 2023-01-17 NOTE — PROGRESS NOTES
Patient Care Team:  Cristy Butler MD as PCP - General (Family Medicine)  Litzy Jung MD as Consulting Physician (Gastroenterology)      Subjective     Patient is a 32 y.o. female presents with morbid obesity and her Body mass index is 34.58 kg/m².     She is here for discussion of medical and surgical weight loss options.  She stated she has been with the disease of obesity for year(s).  She stated she suffers from morbid obesity due to her weight gain.  She stated that weight loss helps alleviate these symptoms.   She stated that she has tried other diet regimens such as fasting, calorie counting medications such as phentermine  to help with weight loss.  She stated that she has attempted these conservative methods for weight loss without maintaining long term success.  Today she would like to discuss surgical weight loss options such as the Laparoscopic Sleeve Gastrectomy or the Laparoscopic R - Y Gastric Bypass.     Review of Systems   Constitutional: Negative.    Respiratory: Negative.    Cardiovascular: Negative.    Gastrointestinal: Positive for constipation.   Endocrine: Negative.    Musculoskeletal: Negative.    Psychiatric/Behavioral: Negative.         History  Past Medical History:   Diagnosis Date   • Anxiety       Past Surgical History:   Procedure Laterality Date   • APPENDECTOMY        Social History     Socioeconomic History   • Marital status:    Tobacco Use   • Smoking status: Never   • Smokeless tobacco: Never   Substance and Sexual Activity   • Alcohol use: No   • Drug use: No   • Sexual activity: Defer      Family History   Problem Relation Age of Onset   • Hyperlipidemia Mother    • Hypertension Mother    • No Known Problems Father    • No Known Problems Sister    • Hypertension Brother    • Cancer Maternal Grandmother    • Colon cancer Maternal Grandmother    • Heart disease Paternal Grandfather       No Known Allergies       Current Outpatient Medications:   •  PARoxetine  (PAXIL) 20 MG tablet, Take 1 tablet by mouth Every Morning., Disp: 90 tablet, Rfl: 1    Objective     Vital Signs  Temp:  [97.6 °F (36.4 °C)] 97.6 °F (36.4 °C)  Heart Rate:  [74] 74  BP: (109)/(73) 109/73  Body mass index is 34.58 kg/m².      01/17/23  1353   Weight: 86.5 kg (190 lb 9.6 oz)       Physical Exam       Results Review:   I reviewed the patient's new clinical results.      BMI is >= 30 and <35. (Class 1 Obesity). The following options were offered after discussion;: weight loss educational material (shared in after visit summary), exercise counseling/recommendations and nutrition counseling/recommendations      Assessment & Plan   Diagnoses and all orders for this visit:    1. Class 1 obesity due to excess calories without serious comorbidity with body mass index (BMI) of 34.0 to 34.9 in adult (Primary)  Assessment & Plan:  Patient's (Body mass index is 34.58 kg/m².) indicates that they are obese (BMI >30) with health conditions that include none . Weight is worsening. BMI is is above average; BMI management plan is completed. We discussed portion control and increasing exercise.           I discussed the patient's findings and my recommendations with patient.     She has been provided a structured dietary regimen based off of her behavior.  I discussed with the patient the etiology of the disease of obesity and the potential comorbid conditions associated with this disease.  She was instructed to follow the dietary regimen and follow-up with our program in 1 month's time with any additional questions as they may arise during this time.  We emphasized on focusing on proteins and meals high in fiber as well as adequate hydration that exceed 64 ounces of water daily.    I explained that I anticipate the patient to lose weight prior to her next monthly visit.  I encouraged patient to have a reward day once a month.  1. Today the patient  received the 4 meals/day diet prescription, which was explained to  patient.  Patient will see the dietitian today to further discuss goals for diet, exercise, and lifestyle. Patient has received intensive behavioral therapy for obesity today. I explained the pathophysiology of the disease and its storage component. We also discussed Dr. Montez' pearls of the program. Nutrition counseling ordered today.    Patient will see the dietitian today we are going to begin with behavior modifications and prescription meal plan.  We discussed beginning Saxenda at next appointment.         Christina Bermudez, KOJO     01/18/23  13:08 CST

## 2023-01-17 NOTE — PROGRESS NOTES
"Metabolic and Bariatric Surgery Adult Nutrition Assessment    Patient Name: Aparna Valenzuela   YOB: 1990   MRN: [id number]     Assessment Date:  01/17/2023     Reason for Visit: Initial Nutrition Assessment     Treatment Pathway: Medical Weight Loss    Assessment    Anthropometrics   Wt Readings from Last 1 Encounters:   01/17/23 86.5 kg (190 lb 9.6 oz)     Ht Readings from Last 1 Encounters:   01/17/23 158.1 cm (62.25\")     BMI Readings from Last 1 Encounters:   01/17/23 34.58 kg/m²        Initial Weight/Date: 190.6 lbs (Jan 2023)  Weight Changes since last visit: n/a  Net Weight Change: n/a    Past Medical History:   Diagnosis Date   • Anxiety       Past Surgical History:   Procedure Laterality Date   • APPENDECTOMY        Current Outpatient Medications   Medication Sig Dispense Refill   • PARoxetine (PAXIL) 20 MG tablet Take 1 tablet by mouth Every Morning. 90 tablet 1     No current facility-administered medications for this visit.      No Known Allergies       Motivation for weight loss includes: Keep up with 10yo son, Feel better improve self confidence    Pertinent Social/Behavior/Environmental History: works 4 day, 10 hr shifts. Son plays sports.     Nutrition Recall  Eating no specific meals daily, Moistly eating on the go or [employer] frequently.   24 hr recall reviewed.   Snacking - grazes frequently throughout the day.  Monitoring portions- not currently  Calculating Protein- not currently.   Drinking sugary/carbonated beverages- Coke (3 cans/d)  Fluid Intake- HINT water 4-5 bottles/d.     Exercise: minimal currently due to cold weather; typically more active outside during warmer months  Recommended increasing physical activity, beyond normal daily habits, gradually working to reach ~30 minutes daily.     Nutrition Intervention  Nutrition education and nutrition coaching for behavior change provided.  Strategies used included Comprehensive education, Motivational Interviewing , Problem " Solving, Skill Development for meal planning & compliant meals when eating from restaurants, and Provided sample menus  Review of medical weight loss prescription 4 meal/day plan and reviewed nutritional needs for Medical Weight Loss.  Self-monitoring strategies such as keeping a food journal (on paper or electronically) and calculating fluid/protein intake were discussed.    Recommended Diet Changes  Eat 4 meals per day with protein and vegetables at each meal, no carbs after meal 2., Protein goal: 65 gms., Eat vegetables first at each meal., Discussed protein guidelines for shakes and bars., Reduce snacking -use foods from free foods list only., Reduce fat, sugar, and/or salt in food choices., Choose more nutrient dense foods., Choose foods with increased fiber., Monitor portion sizes using a food scale and/or measuring cup., Eliminate soda and sugar-sweetened beverages and Increase fluid intake to 64 ounces per day      Goals  1. Eat 4 meals daily follow prescription meal plan.  2. Measure portions of all foods at meals.  3. Record food intake in food journal.    Monitoring/Evaluation Plan  Anticipate follow up per program protocol. Continue collaboration of care with physician and treatment team.     Electronically signed by  Swetha Figueroa RDN, LD  01/17/2023 14:27 CST.

## 2023-02-24 ENCOUNTER — NUTRITION (OUTPATIENT)
Dept: BARIATRICS/WEIGHT MGMT | Facility: CLINIC | Age: 33
End: 2023-02-24
Payer: COMMERCIAL

## 2023-02-24 ENCOUNTER — OFFICE VISIT (OUTPATIENT)
Dept: BARIATRICS/WEIGHT MGMT | Facility: CLINIC | Age: 33
End: 2023-02-24
Payer: COMMERCIAL

## 2023-02-24 VITALS
OXYGEN SATURATION: 98 % | BODY MASS INDEX: 37.69 KG/M2 | TEMPERATURE: 98 F | HEIGHT: 60 IN | DIASTOLIC BLOOD PRESSURE: 73 MMHG | WEIGHT: 192 LBS | SYSTOLIC BLOOD PRESSURE: 113 MMHG | HEART RATE: 76 BPM

## 2023-02-24 DIAGNOSIS — E66.09 CLASS 2 OBESITY DUE TO EXCESS CALORIES WITHOUT SERIOUS COMORBIDITY WITH BODY MASS INDEX (BMI) OF 37.0 TO 37.9 IN ADULT: Primary | ICD-10-CM

## 2023-02-24 PROBLEM — E66.812 CLASS 2 OBESITY DUE TO EXCESS CALORIES WITHOUT SERIOUS COMORBIDITY WITH BODY MASS INDEX (BMI) OF 37.0 TO 37.9 IN ADULT: Status: ACTIVE | Noted: 2023-01-17

## 2023-02-24 PROCEDURE — 99213 OFFICE O/P EST LOW 20 MIN: CPT | Performed by: NURSE PRACTITIONER

## 2023-02-24 RX ORDER — SEMAGLUTIDE 0.25 MG/.5ML
0.25 INJECTION, SOLUTION SUBCUTANEOUS WEEKLY
Qty: 2 ML | Refills: 0 | Status: SHIPPED | OUTPATIENT
Start: 2023-02-24 | End: 2023-02-28

## 2023-02-24 NOTE — PROGRESS NOTES
"Patient Care Team:  Cristy Butler MD as PCP - General (Family Medicine)  Litzy Jung MD as Consulting Physician (Gastroenterology)    Reason for Visit:  Medical Weight loss    Subjective   Aparna Valenzuela is a 32 y.o. female.     Aparna is here for follow-up and continued medical management of her morbid obesity.  She is currently on a prescription diet.  Aparna previously was to apply dietary changes such as following the meal plan as directed.  Patient admits to eating around 4 meals per day and admits to grazing in between meals.  she admits to drinking at least 64 ounces or more of fluid each day and around 60 grams of protein. is unsure how much protein she is in taking.  she is currently exercising walking 40 minutes/day  3 days/week.  she states that she has gone without soda intake and denies denies  nicotine use.  Patient has gained 2 pounds since she last appointment with us.         Review Of Systems:  Review of Systems   Constitutional: Negative.    Respiratory: Negative.    Cardiovascular: Negative.    Gastrointestinal: Negative.    Endocrine: Negative.    Musculoskeletal: Negative.    Psychiatric/Behavioral: Negative.          The following portions of the patient's history were reviewed and updated as appropriate: allergies, current medications, past family history, past medical history, past social history, past surgical history, and problem list.    Objective   /73 (BP Location: Right arm, Patient Position: Sitting, Cuff Size: Adult)   Pulse 76   Temp 98 °F (36.7 °C)   Ht 152.4 cm (60\")   Wt 87.1 kg (192 lb)   SpO2 98%   BMI 37.50 kg/m²       02/24/23  0915   Weight: 87.1 kg (192 lb)       Physical Exam  Vitals reviewed.   Constitutional:       Appearance: She is obese.   Cardiovascular:      Rate and Rhythm: Normal rate and regular rhythm.   Pulmonary:      Effort: Pulmonary effort is normal.   Abdominal:      General: Bowel sounds are normal.      Palpations: Abdomen is soft. "   Musculoskeletal:         General: Normal range of motion.   Skin:     General: Skin is warm and dry.   Neurological:      Mental Status: She is alert and oriented to person, place, and time.   Psychiatric:         Mood and Affect: Mood normal.         Behavior: Behavior normal.           Assessment & Plan   Diagnoses and all orders for this visit:    1. Class 2 obesity due to excess calories without serious comorbidity with body mass index (BMI) of 37.0 to 37.9 in adult (Primary)  Assessment & Plan:  Patient's (Body mass index is 37.5 kg/m².) indicates that they are morbidly/severely obese (BMI > 40 or > 35 with obesity - related health condition) with health conditions that include none . Weight is worsening. BMI  is above average; BMI management plan is completed. We discussed portion control and increasing exercise.     Orders:  -     Semaglutide-Weight Management (Wegovy) 0.25 MG/0.5ML solution auto-injector; Inject 0.25 mg under the skin into the appropriate area as directed 1 (One) Time Per Week for 4 days.  Dispense: 2 mL; Refill: 0       Aparnaanaly Valenzuela was seen today for follow-up, obesity, nutrition counseling and weight loss.    Today we discussed healthy changes in lifestyle, diet, and exercise. Dietician consultation obtained.  Aparna Valenzuela had received handouts to her explaining the recommendation on portion sizes/appetite control/reading nutrition labels.   Intensive behavioral therapy for obesity was done today as well.     Goals for this month are:   1. Wegovy prescribed.  Side effects of medication reviewed with patient.  Patient verbalizes understanding and is aware that this will be intended for long-term use.      Follow up in one month for a weight recheck. YELLOW meal plan prescribed to patient.

## 2023-02-24 NOTE — ASSESSMENT & PLAN NOTE
Patient's (Body mass index is 37.5 kg/m².) indicates that they are morbidly/severely obese (BMI > 40 or > 35 with obesity - related health condition) with health conditions that include none . Weight is worsening. BMI  is above average; BMI management plan is completed. We discussed portion control and increasing exercise.

## 2023-02-24 NOTE — PROGRESS NOTES
"Metabolic and Bariatric Surgery Adult Nutrition Assessment    Patient Name: Aparna Valenzuela   YOB: 1990   MRN: [id number]     Assessment Date:  02/24/2023     Reason for Visit: Follow-up Nutrition Assessment     Treatment Pathway: Medical Weight Loss    Assessment    Anthropometrics   Wt Readings from Last 1 Encounters:   02/24/23 87.1 kg (192 lb)     Ht Readings from Last 1 Encounters:   02/24/23 152.4 cm (60\")     BMI Readings from Last 1 Encounters:   02/24/23 37.50 kg/m²        Initial Weight/Date: 190.6 lbs (Jan 2023)  Weight Changes since last visit: +1.4 lbs  Net Weight Change: + 1.4 lbs    Past Medical History:   Diagnosis Date   • Anxiety       Past Surgical History:   Procedure Laterality Date   • APPENDECTOMY        Current Outpatient Medications   Medication Sig Dispense Refill   • PARoxetine (PAXIL) 20 MG tablet Take 1 tablet by mouth Every Morning. 90 tablet 1     No current facility-administered medications for this visit.      No Known Allergies       Nutrition Recall  Eating 4 meals daily   (M1) lots of omelets in the mornings. With vegetables. 1/2 banana.  (M2) 3 oz grilled chicken 1/2 c baked potato, rice, zucchini/squash/bell peppers/carrots.   (M3) 2 oz baked fish with green beans and carrots  (M4) grilled shrimp, asparagus, brussels sprouts, with spinach.  Snacking - rarely.   Monitoring portions- is using scale for protein and cups for other foods.   Calculating Protein- hasn't been, reviewed how today.  Drinking sugary/carbonated beverages- ~1 small can weekly.   Fluid Intake- 3-4 bottles (16 oz) HINT flynn.       Success this Month: Has reduced soda intake this month.   Barriers: craving sweets- reaching for sweets every few days.     Exercise: has been walking ~45 minutes, 2-3 days/wk. ~1mile.   Recommended increasing physical activity, beyond normal daily habits, gradually working to reach ~30 minutes daily.     Nutrition Intervention  Nutrition education and nutrition " "coaching for behavior change provided.  Strategies used included Motivational Interviewing , Problem Solving and Ongoing reinforcement  Review of medical weight loss prescription 4 meal/day plan and reviewed nutritional needs for Medical Weight Loss.  Self-monitoring strategies such as keeping a food journal (on paper or electronically) and calculating fluid/protein intake were discussed.    Recommended Diet Changes- Green Prescription Meal Plan  Eat 4 meals per day with protein and vegetables at each meal, no carbs after meal 2., Protein goal: 65 gms., Eat vegetables first at each meal., Discussed protein guidelines for shakes and bars., Reduce snacking -use foods from free foods list only., Reduce fat, sugar, and/or salt in food choices., Choose more nutrient dense foods., Choose foods with increased fiber., Monitor portion sizes using a food scale and/or measuring cup., Eliminate soda and sugar-sweetened beverages and Increase fluid intake to 64 ounces per day      Goals  1. Try one new type of meal each week.   2. Trial using frozen yogurts or protein supplements for \"dessert\" as the protein choice at a meal.   3. Continue to wean on soda.     Monitoring/Evaluation Plan  Anticipate follow up per program protocol. Continue collaboration of care with physician and treatment team.     Electronically signed by  Swetha Figueroa RDN, LD  02/24/2023 09:19 CST.  "

## 2023-03-06 ENCOUNTER — TELEPHONE (OUTPATIENT)
Dept: BARIATRICS/WEIGHT MGMT | Facility: CLINIC | Age: 33
End: 2023-03-06
Payer: COMMERCIAL

## 2023-03-06 RX ORDER — ONDANSETRON 4 MG/1
4 TABLET, FILM COATED ORAL
Qty: 30 TABLET | Refills: 0 | Status: SHIPPED | OUTPATIENT
Start: 2023-03-06 | End: 2023-03-13

## 2023-03-06 NOTE — TELEPHONE ENCOUNTER
Called pt to check and see if she was eating slow and following the meal plan with low fat and low proceeded foods. Pt voiced that she is following the plan and voiced an understanding of what she should be doing.

## 2023-03-22 ENCOUNTER — OFFICE VISIT (OUTPATIENT)
Dept: BARIATRICS/WEIGHT MGMT | Facility: CLINIC | Age: 33
End: 2023-03-22
Payer: COMMERCIAL

## 2023-03-22 VITALS
TEMPERATURE: 98.4 F | DIASTOLIC BLOOD PRESSURE: 75 MMHG | BODY MASS INDEX: 35.85 KG/M2 | HEART RATE: 79 BPM | SYSTOLIC BLOOD PRESSURE: 115 MMHG | OXYGEN SATURATION: 97 % | HEIGHT: 60 IN | WEIGHT: 182.6 LBS

## 2023-03-22 DIAGNOSIS — E66.09 CLASS 2 OBESITY DUE TO EXCESS CALORIES WITHOUT SERIOUS COMORBIDITY WITH BODY MASS INDEX (BMI) OF 35.0 TO 35.9 IN ADULT: Primary | ICD-10-CM

## 2023-03-22 PROCEDURE — 99213 OFFICE O/P EST LOW 20 MIN: CPT | Performed by: NURSE PRACTITIONER

## 2023-03-22 RX ORDER — SEMAGLUTIDE 0.5 MG/.5ML
0.5 INJECTION, SOLUTION SUBCUTANEOUS WEEKLY
Qty: 2 ML | Refills: 0 | Status: SHIPPED | OUTPATIENT
Start: 2023-03-22 | End: 2023-04-13

## 2023-03-22 NOTE — PROGRESS NOTES
"Patient Care Team:  Cristy Butler MD as PCP - General (Family Medicine)  Litzy Jung MD as Consulting Physician (Gastroenterology)    Reason for Visit:  Medical Weight loss    Subjective   Aparna Valenzuela is a 32 y.o. female.     Aparna is here for follow-up and continued medical management of her morbid obesity.  She is currently on a prescription diet.  Aparna previously was to apply dietary changes such as following the meal plan as directed.  Patient admits to eating around 4 meals per day and admits to grazing in between meals.  she admits to drinking at least 40 ounces of fluid each day and is unsure how many grams of protein. is unsure how much protein she is in taking.  she is currently exercising biking 60 minutes/day 4 days/week.  she states that she has gone without soda intake and denies denies  nicotine use.  Patient has lost 10 pound since she last appointment with us.     Review Of Systems:  Review of Systems   Constitutional: Negative.    Respiratory: Negative.    Cardiovascular: Negative.    Gastrointestinal: Negative.    Endocrine: Negative.    Musculoskeletal: Negative.    Psychiatric/Behavioral: Negative.          The following portions of the patient's history were reviewed and updated as appropriate: allergies, current medications, past family history, past medical history, past social history, past surgical history, and problem list.    Objective   /75 (BP Location: Right arm, Patient Position: Sitting, Cuff Size: Adult)   Pulse 79   Temp 98.4 °F (36.9 °C)   Ht 152.4 cm (60\")   Wt 82.8 kg (182 lb 9.6 oz)   SpO2 97%   BMI 35.66 kg/m²       03/22/23  1440   Weight: 82.8 kg (182 lb 9.6 oz)       Physical Exam  Vitals reviewed.   Constitutional:       Appearance: She is obese.   Cardiovascular:      Rate and Rhythm: Normal rate and regular rhythm.   Pulmonary:      Effort: Pulmonary effort is normal.   Abdominal:      General: Bowel sounds are normal.      Palpations: Abdomen " is soft.   Musculoskeletal:         General: Normal range of motion.   Skin:     General: Skin is warm and dry.   Neurological:      Mental Status: She is alert and oriented to person, place, and time.   Psychiatric:         Mood and Affect: Mood normal.         Behavior: Behavior normal.           Assessment & Plan   Diagnoses and all orders for this visit:    1. Class 2 obesity due to excess calories without serious comorbidity with body mass index (BMI) of 35.0 to 35.9 in adult (Primary)  Assessment & Plan:  Patient's (Body mass index is 35.66 kg/m².) indicates that they are morbidly/severely obese (BMI > 40 or > 35 with obesity - related health condition) with health conditions that include none . Weight is improving with treatment. BMI  is above average; BMI management plan is completed. We discussed portion control and increasing exercise.       Other orders  -     Semaglutide-Weight Management (Wegovy) 0.5 MG/0.5ML solution auto-injector; Inject 0.5 mL under the skin into the appropriate area as directed 1 (One) Time Per Week for 4 doses.  Dispense: 2 mL; Refill: 0       Aparna Valenzuela was seen today for follow-up, obesity, nutrition counseling and weight loss.    Today we discussed healthy changes in lifestyle, diet, and exercise. Dietician consultation obtained.  Aparna Valenzuela had received handouts to her explaining the recommendation on portion sizes/appetite control/reading nutrition labels.   Intensive behavioral therapy for obesity was done today as well.     Goals for this month are:   1. Increase water intake   2.  Will increase Wegovy dosage from 0.25 2.5.  Overall patient is tolerating it well.  She has Zofran for as needed nausea.  3.  Continue working towards increasing vegetable intake.  Overall patient is doing very well.    Follow up in one month for a weight recheck.A total of 20 minutes was spent face to face with this patient and over half of the time was spent on counseling and  coordination of care for the disease of obesity. We specifically reviewed the dietary prescription and I made recommendations toward increasing exercise as tolerated as well as focusing on training their behavior toward storing less.

## 2023-03-23 NOTE — ASSESSMENT & PLAN NOTE
Patient's (Body mass index is 35.66 kg/m².) indicates that they are morbidly/severely obese (BMI > 40 or > 35 with obesity - related health condition) with health conditions that include none . Weight is improving with treatment. BMI  is above average; BMI management plan is completed. We discussed portion control and increasing exercise.

## 2023-04-17 DIAGNOSIS — E66.09 CLASS 2 OBESITY DUE TO EXCESS CALORIES WITHOUT SERIOUS COMORBIDITY WITH BODY MASS INDEX (BMI) OF 35.0 TO 35.9 IN ADULT: Primary | ICD-10-CM

## 2023-04-17 RX ORDER — SEMAGLUTIDE 1 MG/.5ML
1 INJECTION, SOLUTION SUBCUTANEOUS WEEKLY
Qty: 2 ML | Refills: 0 | Status: SHIPPED | OUTPATIENT
Start: 2023-04-17 | End: 2023-05-09

## 2023-04-18 RX ORDER — ONDANSETRON 4 MG/1
4 TABLET, FILM COATED ORAL
Qty: 30 TABLET | Refills: 0 | Status: SHIPPED | OUTPATIENT
Start: 2023-04-18 | End: 2023-04-28

## 2023-05-03 ENCOUNTER — OFFICE VISIT (OUTPATIENT)
Dept: BARIATRICS/WEIGHT MGMT | Facility: CLINIC | Age: 33
End: 2023-05-03
Payer: COMMERCIAL

## 2023-05-03 VITALS
TEMPERATURE: 98.7 F | DIASTOLIC BLOOD PRESSURE: 74 MMHG | BODY MASS INDEX: 33.14 KG/M2 | WEIGHT: 168.8 LBS | HEIGHT: 60 IN | HEART RATE: 80 BPM | OXYGEN SATURATION: 98 % | SYSTOLIC BLOOD PRESSURE: 113 MMHG

## 2023-05-03 DIAGNOSIS — E66.09 CLASS 1 OBESITY DUE TO EXCESS CALORIES WITH SERIOUS COMORBIDITY AND BODY MASS INDEX (BMI) OF 32.0 TO 32.9 IN ADULT: Primary | ICD-10-CM

## 2023-05-03 RX ORDER — SEMAGLUTIDE 1 MG/.5ML
1 INJECTION, SOLUTION SUBCUTANEOUS WEEKLY
Qty: 2 ML | Refills: 2 | Status: SHIPPED | OUTPATIENT
Start: 2023-05-03 | End: 2023-05-25

## 2023-05-03 NOTE — PROGRESS NOTES
"Patient Care Team:  Cristy Butelr MD as PCP - General (Family Medicine)  Litzy Jung MD as Consulting Physician (Gastroenterology)    Reason for Visit:  Medical Weight loss    Subjective   Aparna Valenzuela is a 32 y.o. female.     Aparna is here for follow-up and continued medical management of her morbid obesity.  She is currently on a prescription diet.  Aparna previously was to apply dietary changes such as following the meal plan as directed.  Patient admits to eating around 3 and 4 meals per day and admits to grazing in between meals.  she admits to drinking at least 64 ounces or more of fluid each day and is unsure how many grams of protein.  . she is currently exercising by going to [employer]..  she states that she has gone without soda intake and denies denies  nicotine use.  Patient has lost 24 pounds since her last appointment with us.     Review Of Systems:  Review of Systems   Constitutional: Negative.    Respiratory: Negative.    Cardiovascular: Negative.    Gastrointestinal: Negative.    Endocrine: Negative.    Musculoskeletal: Negative.    Psychiatric/Behavioral: The patient is nervous/anxious.          The following portions of the patient's history were reviewed and updated as appropriate: allergies, current medications, past family history, past medical history, past social history, past surgical history, and problem list.    Objective   /74 (BP Location: Right arm, Patient Position: Sitting, Cuff Size: Adult)   Pulse 80   Temp 98.7 °F (37.1 °C)   Ht 152.4 cm (60\")   Wt 76.6 kg (168 lb 12.8 oz)   SpO2 98%   BMI 32.97 kg/m²       05/03/23  0941   Weight: 76.6 kg (168 lb 12.8 oz)       Physical Exam  Vitals reviewed.   Constitutional:       Appearance: She is obese.   Cardiovascular:      Rate and Rhythm: Normal rate and regular rhythm.   Pulmonary:      Effort: Pulmonary effort is normal.   Abdominal:      General: Bowel sounds are normal.      Palpations: Abdomen is soft. "   Musculoskeletal:         General: Normal range of motion.   Skin:     General: Skin is warm and dry.   Neurological:      Mental Status: She is alert and oriented to person, place, and time.   Psychiatric:         Mood and Affect: Mood normal.         Behavior: Behavior normal.           Assessment & Plan   Diagnoses and all orders for this visit:    1. Class 1 obesity due to excess calories with serious comorbidity and body mass index (BMI) of 32.0 to 32.9 in adult (Primary)  Assessment & Plan:  Patient's (Body mass index is 32.97 kg/m².) indicates that they are morbidly/severely obese (BMI > 40 or > 35 with obesity - related health condition) with health conditions that include none . Weight is improving. BMI  is above average; BMI management plan is completed. We discussed portion control and increasing exercise.       Other orders  -     Semaglutide-Weight Management (Wegovy) 1 MG/0.5ML solution auto-injector; Inject 0.5 mL under the skin into the appropriate area as directed 1 (One) Time Per Week for 4 doses.  Dispense: 2 mL; Refill: 2       Aparna Valenzuela was seen today for follow-up, obesity, nutrition counseling and weight loss.    Today we discussed healthy changes in lifestyle, diet, and exercise. Dietician consultation obtained.  Aparna Valenzuela had received handouts to her explaining the recommendation on portion sizes/appetite control/reading nutrition labels.   Intensive behavioral therapy for obesity was done today as well.     Goals for this month are:   1. Only snack on free foods   2. Consistent 4 meals per day   3.  Continue logging meals each day     Patient will remain on 1 mg of Wegvoy. Overall patient is doing very well. I reviewed yesterday's meals with her and all are compliant with meal plan.     Follow up in one month for a weight recheck.A total of 20 minutes was spent face to face with this patient and over half of the time was spent on counseling and coordination of care for the  disease of obesity. We specifically reviewed the dietary prescription and I made recommendations toward increasing exercise as tolerated as well as focusing on training their behavior toward storing less.

## 2023-05-03 NOTE — ASSESSMENT & PLAN NOTE
Patient's (Body mass index is 32.97 kg/m².) indicates that they are morbidly/severely obese (BMI > 40 or > 35 with obesity - related health condition) with health conditions that include none . Weight is improving. BMI  is above average; BMI management plan is completed. We discussed portion control and increasing exercise.

## 2023-05-22 RX ORDER — SEMAGLUTIDE 1.7 MG/.75ML
1.7 INJECTION, SOLUTION SUBCUTANEOUS WEEKLY
Qty: 3 ML | Refills: 0 | Status: SHIPPED | OUTPATIENT
Start: 2023-05-22

## 2023-06-05 RX ORDER — ONDANSETRON 4 MG/1
4 TABLET, FILM COATED ORAL
Qty: 30 TABLET | Refills: 0 | Status: SHIPPED | OUTPATIENT
Start: 2023-06-05 | End: 2023-06-15

## 2023-08-08 ENCOUNTER — OFFICE VISIT (OUTPATIENT)
Dept: BARIATRICS/WEIGHT MGMT | Facility: CLINIC | Age: 33
End: 2023-08-08
Payer: COMMERCIAL

## 2023-08-08 VITALS
TEMPERATURE: 98.7 F | HEART RATE: 80 BPM | BODY MASS INDEX: 28.51 KG/M2 | OXYGEN SATURATION: 97 % | HEIGHT: 60 IN | SYSTOLIC BLOOD PRESSURE: 97 MMHG | WEIGHT: 145.2 LBS | DIASTOLIC BLOOD PRESSURE: 55 MMHG

## 2023-08-08 DIAGNOSIS — E66.3 OVERWEIGHT WITH BODY MASS INDEX (BMI) OF 28 TO 28.9 IN ADULT: Primary | ICD-10-CM

## 2023-08-08 DIAGNOSIS — R11.0 NAUSEA: ICD-10-CM

## 2023-08-08 RX ORDER — ONDANSETRON 4 MG/1
4 TABLET, FILM COATED ORAL
Qty: 30 TABLET | Refills: 0 | Status: SHIPPED | OUTPATIENT
Start: 2023-08-08 | End: 2023-08-18

## 2023-08-08 RX ORDER — CLONAZEPAM 0.5 MG/1
0.5 TABLET ORAL 2 TIMES DAILY PRN
COMMUNITY
Start: 2023-07-17

## 2023-08-08 NOTE — PROGRESS NOTES
"Patient Care Team:  Cristy Butler MD as PCP - General (Family Medicine)  Litzy Jung MD as Consulting Physician (Gastroenterology)    Reason for Visit:  Medical Weight loss, Wegovy- yellow meal plan     Subjective   Aparna Valenzuela is a 32 y.o. female.     Aparna is here for follow-up and continued medical management of her morbid obesity.  She is currently on a prescription diet.  Aparna previously was to apply dietary changes such as following the meal plan as directed.  Patient admits to eating around 4 meals per day and admits to grazing in between meals.  she admits to drinking at least 64 ounces or more of fluid each day and  60 grams of protein.  she is currently exercising walking and using the stair climber twice per week..  she states that she has gone without soda intake and denies denies  nicotine use.  Patient has lost 23  pounds since her last appointment with us.     Review Of Systems:  Review of Systems   Constitutional: Negative.    Respiratory: Negative.     Cardiovascular: Negative.    Gastrointestinal: Negative.    Endocrine: Negative.    Musculoskeletal: Negative.    Psychiatric/Behavioral:  The patient is nervous/anxious.        The following portions of the patient's history were reviewed and updated as appropriate: allergies, current medications, past family history, past medical history, past social history, past surgical history, and problem list.    Objective   BP 97/55 (BP Location: Right arm, Patient Position: Sitting, Cuff Size: Adult)   Pulse 80   Temp 98.7 øF (37.1 øC)   Ht 152.4 cm (60\")   Wt 65.9 kg (145 lb 3.2 oz)   SpO2 97%   BMI 28.36 kg/mý       08/08/23  0905   Weight: 65.9 kg (145 lb 3.2 oz)       Physical Exam  Vitals reviewed.   Constitutional:       Appearance: She is obese.   Cardiovascular:      Rate and Rhythm: Normal rate and regular rhythm.   Pulmonary:      Effort: Pulmonary effort is normal.   Abdominal:      General: Bowel sounds are normal.      " Palpations: Abdomen is soft.   Musculoskeletal:         General: Normal range of motion.   Skin:     General: Skin is warm and dry.   Neurological:      Mental Status: She is alert and oriented to person, place, and time.   Psychiatric:         Mood and Affect: Mood normal.         Behavior: Behavior normal.         Assessment & Plan   Diagnoses and all orders for this visit:    1. Overweight with body mass index (BMI) of 28 to 28.9 in adult (Primary)  Comments:  Continue Wegoy and Yellow meal plan         Aparna Valenzuela was seen today for follow-up, obesity, nutrition counseling and weight loss.    Today we discussed healthy changes in lifestyle, diet, and exercise. Dietician consultation obtained.  Aparna Valenzuela had received handouts to her explaining the recommendation on portion sizes/appetite control/reading nutrition labels.   Intensive behavioral therapy for obesity was done today as well.     Goals for this month are:   Continue Weovy 1.7 mg   See dietitian in 1 month and continue logging meals. She is using an eileen on her phone   Continue consistent exercise routine and increasing vegetables   She states she is doing well with snacking only on free foods.     Follow up in one month for a weight recheck.

## 2023-08-13 RX ORDER — SEMAGLUTIDE 1.7 MG/.75ML
1.7 INJECTION, SOLUTION SUBCUTANEOUS WEEKLY
Qty: 3 ML | Refills: 0 | Status: SHIPPED | OUTPATIENT
Start: 2023-08-13

## 2023-08-28 DIAGNOSIS — R11.0 NAUSEA: ICD-10-CM

## 2023-08-29 RX ORDER — ONDANSETRON 4 MG/1
4 TABLET, FILM COATED ORAL
Qty: 30 TABLET | Refills: 0 | Status: SHIPPED | OUTPATIENT
Start: 2023-08-29 | End: 2023-09-08

## 2023-09-13 ENCOUNTER — TELEMEDICINE (OUTPATIENT)
Dept: BARIATRICS/WEIGHT MGMT | Facility: CLINIC | Age: 33
End: 2023-09-13
Payer: COMMERCIAL

## 2023-09-13 DIAGNOSIS — E66.3 OVERWEIGHT WITH BODY MASS INDEX (BMI) OF 28 TO 28.9 IN ADULT: Primary | ICD-10-CM

## 2023-09-13 RX ORDER — SEMAGLUTIDE 1.7 MG/.75ML
1.7 INJECTION, SOLUTION SUBCUTANEOUS WEEKLY
Qty: 3 ML | Refills: 2 | Status: SHIPPED | OUTPATIENT
Start: 2023-09-13

## 2023-09-13 NOTE — PROGRESS NOTES
Patient Care Team:  Crisyt Butler MD as PCP - General (Family Medicine)  Litzy Jung MD as Consulting Physician (Gastroenterology)    Reason for Visit:  Surgical Weight loss    You have chosen to receive care through a telehealth visit.  Do you consent to use a video/audio connection for your medical care today? Yes    Type of Visit: Video Visit  Location of Patient: Work   Location of Provider: [facility]   Aparna Valenzuela is a 32 y.o. female.     Aparna is here for follow-up and continued medical management of her morbid obesity.  She is currently on a prescription diet.  Aparna previously was to apply dietary changes such as following the meal plan as directed.  Patient admits to eating around 4 meals per day.  She admits to drinking at least 64 ounces or more of fluid each day and intaking at least 60 grams of protein..  She is currently exercising by going to [employer] 3 x per week minimum..  She states that she has gone without soda intake and denies  nicotine use.  Patient has lost 6  pounds since her last appointment with us.     Review Of Systems:  Review of Systems   Constitutional: Negative.    Respiratory: Negative.     Cardiovascular: Negative.    Gastrointestinal: Negative.    Endocrine: Negative.    Musculoskeletal: Negative.    Psychiatric/Behavioral: Negative.         The following portions of the patient's history were reviewed and updated as appropriate: allergies, current medications, past family history, past medical history, past social history, past surgical history, and problem list.    Objective   There were no vitals taken for this visit.  There were no vitals filed for this visit.    Physical Exam  Neurological:      Mental Status: She is alert.   Psychiatric:         Behavior: Behavior normal.       Assessment & Plan     Encounter Diagnoses   Name Primary?    Overweight with body mass index (BMI) of 28 to 28.9 in adult Yes       Aparna Valenzuela  was seen today for follow-up, obesity, nutrition counseling and weight loss.    Today we discussed healthy changes in lifestyle, diet, and exercise. Dietician consultation obtained.  Aparna Valenzuela had received handouts to her explaining the recommendation on portion sizes/appetite control/reading nutrition labels.   Intensive behavioral therapy for obesity was done today as well.     Goals for this month are:   Meal recall Meal 1- 2 eggs with spinach mushroom and cheese and 1/2 avocado and 1/2 banana.   Add spinach to protein shakes and consider making own with powder.     Follow up in 1 month for a weight recheck.

## 2023-09-21 NOTE — PROGRESS NOTES
"Subjective cc: anxiety   Aparna Valenzuela is a 29 y.o. female who presents for follow up on anxiety.  She is doing well on current medication. She has no acute complaints.   She is due for pap smear and she is interested in trying to get pregnant again - would like to see GYN to discuss further.   She did not see GI for her Hep C - she is interested in following up with them since her  was treated.   She is due to have repeat annual labs.      Anxiety   Presents for follow-up visit. Symptoms include nervous/anxious behavior. Patient reports no chest pain, compulsions, decreased concentration, depressed mood, feeling of choking, palpitations, panic or suicidal ideas. Symptoms occur occasionally. The severity of symptoms is mild. The quality of sleep is fair. Nighttime awakenings: occasional.     Compliance with medications is %. Treatment side effects: none.        The following portions of the patient's history were reviewed and updated as appropriate: allergies, current medications, past family history, past medical history, past social history, past surgical history and problem list.        Review of Systems   Constitutional: Negative for activity change, appetite change, fatigue and fever.   Cardiovascular: Negative for chest pain and palpitations.   Genitourinary: Positive for menstrual problem.   Psychiatric/Behavioral: Negative for decreased concentration and suicidal ideas. The patient is nervous/anxious.    All other systems reviewed and are negative.      Objective   Blood pressure 102/64, pulse 81, temperature 97.9 °F (36.6 °C), resp. rate 16, height 157.5 cm (62\"), weight 74.8 kg (165 lb), SpO2 96 %, not currently breastfeeding.  Physical Exam   Constitutional: She is oriented to person, place, and time. She appears well-developed and well-nourished. No distress.   HENT:   Head: Normocephalic and atraumatic.   Right Ear: External ear normal.   Left Ear: External ear normal.   Eyes: " Conjunctivae and EOM are normal. Right eye exhibits no discharge. Left eye exhibits no discharge.   Neck: Normal range of motion. No tracheal deviation present. No thyromegaly present.   Cardiovascular: Normal rate, regular rhythm, normal heart sounds and intact distal pulses.   Pulmonary/Chest: Effort normal and breath sounds normal. No stridor. No respiratory distress. She has no wheezes. She exhibits no tenderness.   Abdominal: Soft. Bowel sounds are normal. She exhibits no distension. There is no tenderness.   Musculoskeletal: Normal range of motion. She exhibits no edema.   Lymphadenopathy:     She has no cervical adenopathy.   Neurological: She is alert and oriented to person, place, and time. She exhibits normal muscle tone. Coordination normal.   Skin: Skin is warm and dry. She is not diaphoretic.   Psychiatric: She has a normal mood and affect. Her behavior is normal. Judgment and thought content normal.   Nursing note and vitals reviewed.      Assessment/Plan   Problems Addressed this Visit        Other    Anxiety - Primary    Relevant Medications    PARoxetine (PAXIL) 20 MG tablet      Other Visit Diagnoses     Class 1 obesity due to excess calories without serious comorbidity with body mass index (BMI) of 30.0 to 30.9 in adult        Relevant Orders    Ambulatory Referral to Bariatric Surgery    Positive hepatitis C antibody test        Relevant Orders    Ambulatory Referral to Gastroenterology    Lipid screening        Relevant Orders    Lipid panel    Diabetes mellitus screening        Relevant Orders    Comprehensive metabolic panel    Screening for deficiency anemia        Relevant Orders    CBC (No Diff)    Cervical cancer screening        Relevant Orders    Ambulatory Referral to Obstetrics / Gynecology        PLAN:     #1 anxiety: chronic, stable, continue on current medication     #2 obesity: Patient's Body mass index is 30.18 kg/m². BMI is above normal parameters. Recommendations include:  educational material, exercise counseling and nutrition counseling.    #3 hep C: chronic, referral to GI for further eval and treatment options     #4 screening: will get labs, referral to GYN for pap and to discuss fertility           This document has been electronically signed by Cristy Butler MD on May 5, 2020 16:54            Subsequent Stages Histo Method Verbiage: Using a similar technique to that described above, a thin layer of tissue was removed from all areas where tumor was visible on the previous stage.  The tissue was again oriented, mapped, dyed, and processed as above.

## 2023-09-26 RX ORDER — ONDANSETRON 4 MG/1
4 TABLET, FILM COATED ORAL
Qty: 30 TABLET | Refills: 0 | Status: SHIPPED | OUTPATIENT
Start: 2023-09-26 | End: 2023-10-06

## 2023-10-15 ENCOUNTER — PATIENT MESSAGE (OUTPATIENT)
Dept: FAMILY MEDICINE CLINIC | Facility: CLINIC | Age: 33
End: 2023-10-15
Payer: COMMERCIAL

## 2023-10-17 RX ORDER — SEMAGLUTIDE 1.7 MG/.75ML
1.7 INJECTION, SOLUTION SUBCUTANEOUS WEEKLY
Qty: 3 ML | Refills: 2 | Status: SHIPPED | OUTPATIENT
Start: 2023-10-17

## 2023-10-17 NOTE — TELEPHONE ENCOUNTER
From: Aparna Valenzuela  To: Cristy Butler  Sent: 10/15/2023 7:20 PM CDT  Subject: Wegovy     Hey girl. Just a reminder to Please not forget to call in my wegovy tomorrow. My dad and  are meeting us in [place] Wednesday because we have a soccer tournament there this weekend so they are going to bring it to me when they come. I usually take my shot tomrorow

## 2023-10-26 DIAGNOSIS — R11.0 NAUSEA: Primary | ICD-10-CM

## 2023-10-26 RX ORDER — ONDANSETRON 4 MG/1
4 TABLET, FILM COATED ORAL
Qty: 30 TABLET | Refills: 0 | Status: SHIPPED | OUTPATIENT
Start: 2023-10-26 | End: 2023-11-05

## 2023-11-09 RX ORDER — SEMAGLUTIDE 1.7 MG/.75ML
1.7 INJECTION, SOLUTION SUBCUTANEOUS WEEKLY
Qty: 3 ML | Refills: 2 | Status: SHIPPED | OUTPATIENT
Start: 2023-11-09

## 2023-11-29 DIAGNOSIS — R11.0 NAUSEA: ICD-10-CM

## 2023-11-29 RX ORDER — ONDANSETRON 4 MG/1
4 TABLET, FILM COATED ORAL
Qty: 30 TABLET | Refills: 0 | Status: SHIPPED | OUTPATIENT
Start: 2023-11-29 | End: 2023-12-09

## 2023-12-14 ENCOUNTER — TELEMEDICINE (OUTPATIENT)
Dept: BARIATRICS/WEIGHT MGMT | Facility: CLINIC | Age: 33
End: 2023-12-14
Payer: COMMERCIAL

## 2023-12-14 DIAGNOSIS — E66.3 OVERWEIGHT WITH BODY MASS INDEX (BMI) OF 28 TO 28.9 IN ADULT: Primary | ICD-10-CM

## 2023-12-14 RX ORDER — SEMAGLUTIDE 1.7 MG/.75ML
1.7 INJECTION, SOLUTION SUBCUTANEOUS WEEKLY
Qty: 3 ML | Refills: 2 | Status: SHIPPED | OUTPATIENT
Start: 2023-12-14

## 2023-12-14 NOTE — PROGRESS NOTES
Patient Care Team:  Cristy Butler MD as PCP - General (Family Medicine)  Litzy Jung MD as Consulting Physician (Gastroenterology)    Reason for Visit:  Surgical Weight loss    You have chosen to receive care through a telehealth visit.  Do you consent to use a video/audio connection for your medical care today? Yes    Type of Visit: Video Visit  Location of Patient: Home   Location of Provider: [facility] Val[facility]uela is a 33 y.o. female.     Aparna is here for follow-up and continued medical management of her morbid obesity.  She is currently on a prescription diet.  Aparna previously was to apply dietary changes such as following the meal plan as directed.  Patient admits to eating around 4 meals per day.  She admits to drinking at least 32 ounces of fluid each day and intaking around 65 grams of protein..  She is currently exercising by going to [employer]..  She states that she has gone without soda intake and denies  nicotine use.  Patient has lost    pounds since her last appointment with us.   She states today's weight is 124 lbs     Review Of Systems:  Review of Systems   Constitutional: Negative.    Respiratory: Negative.     Cardiovascular: Negative.    Gastrointestinal: Negative.    Endocrine: Negative.    Musculoskeletal: Negative.    Psychiatric/Behavioral: Negative.           The following portions of the patient's history were reviewed and updated as appropriate: allergies, current medications, past family history, past medical history, past social history, past surgical history, and problem list.    Objective   There were no vitals taken for this visit.  There were no vitals filed for this visit.    Physical Exam  Vitals reviewed.   Constitutional:       Appearance: She is obese.   Cardiovascular:      Rate and Rhythm: Normal rate and regular rhythm.   Neurological:      Mental Status: She is alert and oriented to person, place, and time.   Psychiatric:         Mood  and Affect: Mood normal.         Behavior: Behavior normal.           Assessment & Plan     Encounter Diagnoses   Name Primary?    Overweight with body mass index (BMI) of 28 to 28.9 in adult Yes    Comment: Reviewed meal plan and food journal, advised to continue current regimen.        Aparna Valenzuela was seen today for follow-up, obesity, nutrition counseling and weight loss.    Today we discussed healthy changes in lifestyle, diet, and exercise. Dietician consultation obtained.  Aparna Valenzuela had received handouts to her explaining the recommendation on portion sizes/appetite control/reading nutrition labels.   Intensive behavioral therapy for obesity was done today as well.     Goals for this month are:   Continue logging meals   Continue with exercise regimen   Continue current dosage     Follow up in 1 month for a weight recheck.

## 2024-01-12 RX ORDER — SEMAGLUTIDE 1.7 MG/.75ML
1.7 INJECTION, SOLUTION SUBCUTANEOUS WEEKLY
Qty: 3 ML | Refills: 2 | Status: SHIPPED | OUTPATIENT
Start: 2024-01-12

## 2024-01-12 RX ORDER — ONDANSETRON 4 MG/1
4 TABLET, FILM COATED ORAL
Qty: 30 TABLET | Refills: 0 | Status: SHIPPED | OUTPATIENT
Start: 2024-01-12 | End: 2024-01-22

## 2024-02-14 DIAGNOSIS — E66.3 OVERWEIGHT WITH BODY MASS INDEX (BMI) OF 28 TO 28.9 IN ADULT: Primary | ICD-10-CM

## 2024-02-23 ENCOUNTER — OFFICE VISIT (OUTPATIENT)
Dept: BARIATRICS/WEIGHT MGMT | Facility: CLINIC | Age: 34
End: 2024-02-23
Payer: COMMERCIAL

## 2024-02-23 VITALS
OXYGEN SATURATION: 98 % | WEIGHT: 121.8 LBS | TEMPERATURE: 98.7 F | HEIGHT: 60 IN | DIASTOLIC BLOOD PRESSURE: 56 MMHG | HEART RATE: 93 BPM | BODY MASS INDEX: 23.91 KG/M2 | SYSTOLIC BLOOD PRESSURE: 95 MMHG

## 2024-02-23 DIAGNOSIS — F41.9 ANXIETY: ICD-10-CM

## 2024-02-23 NOTE — PROGRESS NOTES
"Patient Care Team:  Cristy Butler MD as PCP - General (Family Medicine)  Litzy Jung MD as Consulting Physician (Gastroenterology)    Reason for Visit:  Medical Weight loss    Subjective   Aparna Valenzuela is a 33 y.o. female.     Aparna is here for follow-up and continued medical management of her morbid obesity.  She is currently on a prescription diet.  Aparna previously was to apply dietary changes such as following the meal plan as directed.  Patient admits to eating around 4 meals per day.  She  admits to drinking at least 64 ounces or more of fluid each day and intaking at least 65 grams of protein..  She  is currently exercising by going to the gym at least 3 days per week..  She  states that she has gone without soda intake and denies  nicotine use.  Patient has lost 24  pounds since her last appointment with us.  She was on Wegovy up until over a month ago and due to her insurance change Wegovy is no longer covered under her plan. She would like to change to another GLP-1. Her original weight was 190 lbs and a BMI of 34.58 in 1/2023 when she came to the clinic. She began Wegovy in 2/2024 to assist with her obesity.     Review Of Systems:  Review of Systems   Constitutional: Negative.    Respiratory: Negative.     Cardiovascular: Negative.    Gastrointestinal: Negative.    Endocrine: Negative.    Musculoskeletal: Negative.    Psychiatric/Behavioral: Negative.           The following portions of the patient's history were reviewed and updated as appropriate: allergies, current medications, past family history, past medical history, past social history, past surgical history, and problem list.    Objective   BP 95/56 (BP Location: Right arm, Patient Position: Sitting, Cuff Size: Adult)   Pulse 93   Temp 98.7 °F (37.1 °C)   Ht 152.4 cm (60\")   Wt 55.2 kg (121 lb 12.8 oz)   SpO2 98%   BMI 23.79 kg/m²       02/23/24  0922   Weight: 55.2 kg (121 lb 12.8 oz)            Physical Exam  Vitals " reviewed.   Constitutional:       Appearance: She is obese.   Cardiovascular:      Rate and Rhythm: Normal rate and regular rhythm.   Pulmonary:      Effort: Pulmonary effort is normal.   Abdominal:      General: Bowel sounds are normal.      Palpations: Abdomen is soft.   Musculoskeletal:         General: Normal range of motion.   Skin:     General: Skin is warm and dry.   Neurological:      Mental Status: She is alert and oriented to person, place, and time.   Psychiatric:         Mood and Affect: Mood normal.         Behavior: Behavior normal.         BMI is within normal parameters. No other follow-up for BMI required.     Assessment & Plan   Diagnoses and all orders for this visit:    1. Body mass index (BMI) 23.0-23.9, adult (Primary)  Comments:  Reviewed GREEN meal plan, Advised to increase food intake until GLP-1 restarts then go back to yellow meal plan    2. Anxiety         Aparnaanaly Valenzuela was seen today for follow-up, obesity, nutrition counseling and weight loss.    Today we discussed healthy changes in lifestyle, diet, and exercise. Dietician consultation obtained.  Aparna L Nicolas had received handouts to her explaining the recommendation on portion sizes/appetite control/reading nutrition labels.   Intensive behavioral therapy for obesity was done today as well.     Goals for this month are:     Follow up in 1 month for a weight recheck.Will CLARA Walker to assist with maintenance of the disease of obesity. Due to initial BMI of 34 patient needs to maintain current medication regimen and diet regimen. I will lower her maintenance dose. She is aware to begin at starting dose and increase one per week     Patient will continue YELLOW meal plan.

## 2024-02-26 DIAGNOSIS — E66.3 OVERWEIGHT WITH BODY MASS INDEX (BMI) OF 28 TO 28.9 IN ADULT: ICD-10-CM

## 2024-08-27 ENCOUNTER — OFFICE VISIT (OUTPATIENT)
Dept: FAMILY MEDICINE CLINIC | Age: 34
End: 2024-08-27

## 2024-08-27 VITALS
HEART RATE: 85 BPM | BODY MASS INDEX: 27.57 KG/M2 | WEIGHT: 155.6 LBS | DIASTOLIC BLOOD PRESSURE: 68 MMHG | TEMPERATURE: 100.8 F | OXYGEN SATURATION: 98 % | SYSTOLIC BLOOD PRESSURE: 112 MMHG | HEIGHT: 63 IN

## 2024-08-27 DIAGNOSIS — E66.3 OVERWEIGHT: Primary | ICD-10-CM

## 2024-08-27 DIAGNOSIS — R63.5 WEIGHT GAIN: ICD-10-CM

## 2024-08-27 DIAGNOSIS — R11.0 NAUSEA: ICD-10-CM

## 2024-08-27 RX ORDER — ONDANSETRON 4 MG/1
4 TABLET, ORALLY DISINTEGRATING ORAL 3 TIMES DAILY PRN
Qty: 30 TABLET | Refills: 2 | Status: SHIPPED | OUTPATIENT
Start: 2024-08-27

## 2024-08-27 RX ORDER — CLONAZEPAM 0.5 MG/1
0.5 TABLET ORAL 2 TIMES DAILY PRN
COMMUNITY
Start: 2023-07-17

## 2024-08-27 SDOH — ECONOMIC STABILITY: FOOD INSECURITY: WITHIN THE PAST 12 MONTHS, THE FOOD YOU BOUGHT JUST DIDN'T LAST AND YOU DIDN'T HAVE MONEY TO GET MORE.: NEVER TRUE

## 2024-08-27 SDOH — ECONOMIC STABILITY: FOOD INSECURITY: WITHIN THE PAST 12 MONTHS, YOU WORRIED THAT YOUR FOOD WOULD RUN OUT BEFORE YOU GOT MONEY TO BUY MORE.: NEVER TRUE

## 2024-08-27 SDOH — ECONOMIC STABILITY: INCOME INSECURITY: HOW HARD IS IT FOR YOU TO PAY FOR THE VERY BASICS LIKE FOOD, HOUSING, MEDICAL CARE, AND HEATING?: NOT HARD AT ALL

## 2024-08-27 ASSESSMENT — PATIENT HEALTH QUESTIONNAIRE - PHQ9
SUM OF ALL RESPONSES TO PHQ QUESTIONS 1-9: 0
SUM OF ALL RESPONSES TO PHQ QUESTIONS 1-9: 0
SUM OF ALL RESPONSES TO PHQ9 QUESTIONS 1 & 2: 0
2. FEELING DOWN, DEPRESSED OR HOPELESS: NOT AT ALL
SUM OF ALL RESPONSES TO PHQ QUESTIONS 1-9: 0
SUM OF ALL RESPONSES TO PHQ QUESTIONS 1-9: 0
1. LITTLE INTEREST OR PLEASURE IN DOING THINGS: NOT AT ALL

## 2024-08-27 NOTE — PROGRESS NOTES
[facility] [address] [place] [address]  Dept: [phone]  Dept Fax: [phone]    Visit type: New patient    Reason for Visit: New Patient and Weight Management      Assessment & Plan   Assessment and Plan       Assessment & Plan  Overweight       Orders:    Semaglutide,0.25 or 0.5MG/DOS, 2 MG/3ML SOPN; Inject 0.25 mg into the skin every 7 days for 28 days, THEN 0.5 mg every 7 days.    Weight gain       Orders:    Semaglutide,0.25 or 0.5MG/DOS, 2 MG/3ML SOPN; Inject 0.25 mg into the skin every 7 days for 28 days, THEN 0.5 mg every 7 days.    Nausea       Orders:    ondansetron (ZOFRAN-ODT) 4 MG disintegrating tablet; Take 1 tablet by mouth 3 times daily as needed for Nausea or Vomiting    Proper use of medication and dietary and lifestyle modifications that may beneficial.  Discussed signs and symptoms requiring medical attention.  All questions were answered and patient voiced understanding and agreement with plan as discussed.    Return if symptoms worsen or fail to improve, for next scheduled follow up with PCP.       Subjective       HPI   Patient has had issues with her weight and has been on wegovy and has done well with it but her insurance is no longer paying for it and she was recommended to stay on it for at least a few years but with the increased cost she has been unable to stay on the medicine and is interested in seeing about getting it from [facility].     Review of Systems   Constitutional:  Negative for activity change, appetite change, fatigue and fever.   HENT:  Negative for congestion and rhinorrhea.    Respiratory:  Negative for cough and shortness of breath.    Cardiovascular:  Negative for chest pain and palpitations.   Gastrointestinal:  Negative for abdominal pain, constipation, diarrhea, nausea and vomiting.   Genitourinary:  Negative for dysuria and hematuria.   Musculoskeletal:  Negative for back pain, gait problem and neck pain.   Skin:  Negative for

## 2024-10-02 RX ORDER — AZITHROMYCIN 250 MG/1
TABLET, FILM COATED ORAL
Qty: 6 TABLET | Refills: 0 | Status: SHIPPED | OUTPATIENT
Start: 2024-10-02 | End: 2024-10-12

## 2024-10-02 RX ORDER — METHYLPREDNISOLONE 4 MG
TABLET, DOSE PACK ORAL
Qty: 1 KIT | Refills: 0 | Status: SHIPPED | OUTPATIENT
Start: 2024-10-02 | End: 2024-10-08

## 2024-12-23 DIAGNOSIS — R11.0 NAUSEA: ICD-10-CM

## 2024-12-23 RX ORDER — ONDANSETRON 4 MG/1
4 TABLET, ORALLY DISINTEGRATING ORAL 3 TIMES DAILY PRN
Qty: 30 TABLET | Refills: 2 | Status: SHIPPED | OUTPATIENT
Start: 2024-12-23

## 2025-02-26 ENCOUNTER — OFFICE VISIT (OUTPATIENT)
Age: 35
End: 2025-02-26
Payer: COMMERCIAL

## 2025-02-26 VITALS
WEIGHT: 152.1 LBS | DIASTOLIC BLOOD PRESSURE: 74 MMHG | HEIGHT: 62 IN | SYSTOLIC BLOOD PRESSURE: 118 MMHG | BODY MASS INDEX: 27.99 KG/M2

## 2025-02-26 DIAGNOSIS — Z31.69 ENCOUNTER FOR PRECONCEPTION CONSULTATION: ICD-10-CM

## 2025-02-26 DIAGNOSIS — Z78.9 NON-SMOKER: ICD-10-CM

## 2025-02-26 DIAGNOSIS — N97.0 ANOVULATORY CYCLE: ICD-10-CM

## 2025-02-26 DIAGNOSIS — Z12.4 ENCOUNTER FOR SCREENING FOR CERVICAL CANCER: ICD-10-CM

## 2025-02-26 DIAGNOSIS — Z01.419 WELL WOMAN EXAM WITH ROUTINE GYNECOLOGICAL EXAM: Primary | ICD-10-CM

## 2025-02-26 PROCEDURE — 87591 N.GONORRHOEAE DNA AMP PROB: CPT | Performed by: OBSTETRICS & GYNECOLOGY

## 2025-02-26 PROCEDURE — 87491 CHLMYD TRACH DNA AMP PROBE: CPT | Performed by: OBSTETRICS & GYNECOLOGY

## 2025-02-26 PROCEDURE — 87624 HPV HI-RISK TYP POOLED RSLT: CPT | Performed by: OBSTETRICS & GYNECOLOGY

## 2025-02-26 PROCEDURE — 87661 TRICHOMONAS VAGINALIS AMPLIF: CPT | Performed by: OBSTETRICS & GYNECOLOGY

## 2025-02-26 RX ORDER — ONDANSETRON 4 MG/1
4 TABLET, ORALLY DISINTEGRATING ORAL
COMMUNITY
Start: 2024-12-23

## 2025-02-26 NOTE — PROGRESS NOTES
Chief Complaint  Gynecologic Exam (Pt here as self referring new gyn for vaginal mass that has worsened over the last couple weeks and is about the size of a baseball and is on the vaginal wall on the anterior side, last pap 2015ish and normal per pt report, and denies any hx abnormal)    Subjective        Aparna Valenzuela presents to [facility]  History of Present Illness    History of Present Illness  The patient is a 34-year-old female who presents as a new patient, self-referred for a yearly exam and evaluation of a mass inside her vagina.    She reports that her  first observed the vaginal mass, which she describes as a hard lump. She has been experiencing back cramping and irregular menstrual cycles, with periods occurring once every two to three months and lasting only a day. These symptoms have persisted since discontinuing birth control several years ago. Despite attempts to conceive, she has been unsuccessful. The vaginal mass causes discomfort upon palpation or manipulation. She has been aware of this mass for approximately two weeks. Her obstetric history includes one child, born in 2013, with no reported difficulties in conception. She is currently in a relationship with a different partner who has fathered multiple children, the youngest being 8 months old. She is not on any medications. Her partner does not have any children and is currently on Suboxone for 15 years, Klonopin, and thyroid medication. She is fasting today.    Her last laboratory work was conducted a year ago at [facility]. She has experienced significant weight loss, from 192 pounds to 118 pounds, but has recently regained some weight. Her previous Pap smears have been normal, and she has not undergone any biopsies or additional testing due to abnormal Pap smear results. She reports no history of smoking.    SOCIAL HISTORY  She does not smoke.    FAMILY HISTORY  Her grandmother had colon cancer. No family  "history of breast cancer.    Objective   Vital Signs:  /74 (BP Location: Right arm, Patient Position: Sitting, Cuff Size: Adult)   Ht 157.5 cm (62\")   Wt 69 kg (152 lb 1.6 oz)   BMI 27.82 kg/m²   Estimated body mass index is 27.82 kg/m² as calculated from the following:    Height as of this encounter: 157.5 cm (62\").    Weight as of this encounter: 69 kg (152 lb 1.6 oz).      Physical Exam  Vitals and nursing note reviewed. Exam conducted with a chaperone present.   Constitutional:       Appearance: Normal appearance.   HENT:      Head: Normocephalic and atraumatic.      Mouth/Throat:      Mouth: Mucous membranes are moist.   Eyes:      Extraocular Movements: Extraocular movements intact.      Pupils: Pupils are equal, round, and reactive to light.   Neck:      Vascular: No carotid bruit.   Cardiovascular:      Rate and Rhythm: Normal rate and regular rhythm.      Pulses: Normal pulses.      Heart sounds: Normal heart sounds. No murmur heard.     No friction rub. No gallop.   Pulmonary:      Effort: Pulmonary effort is normal. No respiratory distress.      Breath sounds: Normal breath sounds. No stridor. No wheezing, rhonchi or rales.   Chest:      Chest wall: No tenderness.   Breasts:     Breasts are symmetrical.      Right: Normal. No swelling, bleeding, inverted nipple, mass, nipple discharge, skin change or tenderness.      Left: Normal. No swelling, bleeding, inverted nipple, mass, nipple discharge, skin change or tenderness.   Abdominal:      General: Abdomen is flat. Bowel sounds are normal. There is no distension.      Palpations: Abdomen is soft. There is no mass.      Tenderness: There is no abdominal tenderness. There is no right CVA tenderness, left CVA tenderness, guarding or rebound.      Hernia: No hernia is present. There is no hernia in the left inguinal area or right inguinal area.   Genitourinary:     General: Normal vulva.      Exam position: Lithotomy position.      Pubic Area: No rash " or pubic lice.       Labia:         Right: No rash, tenderness, lesion or injury.         Left: No rash, tenderness, lesion or injury.       Urethra: No prolapse, urethral pain, urethral swelling or urethral lesion.      Vagina: Normal.      Cervix: Normal.      Uterus: Normal. Not deviated, not enlarged, not fixed, not tender and no uterine prolapse.       Adnexa: Right adnexa normal and left adnexa normal.        Right: No mass, tenderness or fullness.          Left: No mass, tenderness or fullness.        Comments: No abnormalities seen or palpated  Musculoskeletal:         General: Normal range of motion.      Cervical back: Normal range of motion and neck supple. No rigidity. No muscular tenderness.   Lymphadenopathy:      Cervical: No cervical adenopathy.      Upper Body:      Right upper body: No supraclavicular, axillary or pectoral adenopathy.      Left upper body: No supraclavicular, axillary or pectoral adenopathy.      Lower Body: No right inguinal adenopathy. No left inguinal adenopathy.   Skin:     General: Skin is warm and dry.   Neurological:      General: No focal deficit present.      Mental Status: She is alert and oriented to person, place, and time. Mental status is at baseline.   Psychiatric:         Mood and Affect: Mood normal.         Behavior: Behavior normal.         Thought Content: Thought content normal.         Judgment: Judgment normal.        Result Review :                Assessment and Plan   Diagnoses and all orders for this visit:    1. Well woman exam with routine gynecological exam (Primary)  -     Liquid-based Pap Smear, Screening  -     T3, Free  -     T3, Uptake  -     T4, Free  -     TSH  -     CBC & Differential  -     Comprehensive Metabolic Panel  -     Hemoglobin A1c  -     Lipid Panel With LDL / HDL Ratio  -     Vitamin D,25-Hydroxy    2. Encounter for screening for cervical cancer  -     Liquid-based Pap Smear, Screening    3. Encounter for preconception  consultation  -     Insulin, Total  -     Antimullerian Hormone (AMH)    4. Anovulatory cycle  -     Insulin, Total  -     Antimullerian Hormone (AMH)    5. Non-smoker        Assessment & Plan  1. Infertility.  She reports irregular menstrual cycles occurring once every two to three months since discontinuing birth control. A comprehensive discussion was held regarding the potential impact of irregular ovulation on fertility. She has been unable to conceive despite having one child in 2013 with a different partner. Her current partner has no children and is on Suboxone, Klonopin, and thyroid medication. A thyroid panel, fasting insulin level, and anti-Mullerian hormone test will be ordered to investigate potential causes of infertility. A semen analysis kit will be provided for her partner to complete within the next two weeks.    2. Vaginal mass.  She reports noticing a hard lump inside her vagina for the past couple of weeks, which causes pain when touched. Upon examination, no cysts or masses were detected, and the area appeared normal. The possibility of a minor prolapse was discussed. A Pap smear will be conducted today to screen for cervical cancer and dysplasia.    3. Health maintenance.  She was informed about the current recommendations for breast cancer screening, which include starting mammograms at age 40. Given her family history of colon cancer, colon cancer screening is recommended to start closer to age 40, or sooner if she experiences symptoms such as blood in her stool. Routine labs, including cholesterol, kidney function, and liver function tests, will be ordered.    Follow-up  The patient will follow up in 2 weeks to review lab results and undergo an ultrasound examination.         Follow Up   Return in about 2 weeks (around 3/12/2025) for With Gyn U/S, with me.  Patient was given instructions and counseling regarding her condition or for health maintenance advice. Please see specific information  pulled into the AVS if appropriate.         Saul Pandya MD    Patient or patient representative verbalized consent for the use of Ambient Listening during the visit with  Saul Pandya MD for chart documentation. 2/26/2025  09:11 CST

## 2025-02-27 LAB
C TRACH RRNA CVX QL NAA+PROBE: NOT DETECTED
N GONORRHOEA RRNA SPEC QL NAA+PROBE: NOT DETECTED
TRICHOMONAS VAGINALIS PCR: NOT DETECTED

## 2025-03-03 LAB
25(OH)D3+25(OH)D2 SERPL-MCNC: 29.5 NG/ML (ref 30–100)
ALBUMIN SERPL-MCNC: 4.4 G/DL (ref 3.9–4.9)
ALP SERPL-CCNC: 47 IU/L (ref 44–121)
ALT SERPL-CCNC: 9 IU/L (ref 0–32)
AST SERPL-CCNC: 18 IU/L (ref 0–40)
BASOPHILS # BLD AUTO: 0 X10E3/UL (ref 0–0.2)
BASOPHILS NFR BLD AUTO: 1 %
BILIRUB SERPL-MCNC: 0.6 MG/DL (ref 0–1.2)
BUN SERPL-MCNC: 17 MG/DL (ref 6–20)
BUN/CREAT SERPL: 22 (ref 9–23)
CALCIUM SERPL-MCNC: 9 MG/DL (ref 8.7–10.2)
CHLORIDE SERPL-SCNC: 104 MMOL/L (ref 96–106)
CHOLEST SERPL-MCNC: 159 MG/DL (ref 100–199)
CO2 SERPL-SCNC: 23 MMOL/L (ref 20–29)
CREAT SERPL-MCNC: 0.77 MG/DL (ref 0.57–1)
EGFRCR SERPLBLD CKD-EPI 2021: 104 ML/MIN/1.73
EOSINOPHIL # BLD AUTO: 0.1 X10E3/UL (ref 0–0.4)
EOSINOPHIL NFR BLD AUTO: 3 %
ERYTHROCYTE [DISTWIDTH] IN BLOOD BY AUTOMATED COUNT: 12.5 % (ref 11.7–15.4)
GEN CATEG CVX/VAG CYTO-IMP: ABNORMAL
GLOBULIN SER CALC-MCNC: 2.6 G/DL (ref 1.5–4.5)
GLUCOSE SERPL-MCNC: 74 MG/DL (ref 70–99)
HBA1C MFR BLD: 4.8 % (ref 4.8–5.6)
HCT VFR BLD AUTO: 36.1 % (ref 34–46.6)
HDLC SERPL-MCNC: 64 MG/DL
HGB BLD-MCNC: 11.9 G/DL (ref 11.1–15.9)
HPV I/H RISK 4 DNA CVX QL PROBE+SIG AMP: NOT DETECTED
IMM GRANULOCYTES # BLD AUTO: 0 X10E3/UL (ref 0–0.1)
IMM GRANULOCYTES NFR BLD AUTO: 0 %
INSULIN SERPL-ACNC: 10.2 UIU/ML (ref 2.6–24.9)
LAB AP CASE REPORT: ABNORMAL
LAB AP GYN ADDITIONAL INFORMATION: ABNORMAL
LAB AP GYN OTHER FINDINGS: ABNORMAL
LDLC SERPL CALC-MCNC: 82 MG/DL (ref 0–99)
LDLC/HDLC SERPL: 1.3 RATIO (ref 0–3.2)
LYMPHOCYTES # BLD AUTO: 1.6 X10E3/UL (ref 0.7–3.1)
LYMPHOCYTES NFR BLD AUTO: 32 %
Lab: ABNORMAL
MCH RBC QN AUTO: 30.4 PG (ref 26.6–33)
MCHC RBC AUTO-ENTMCNC: 33 G/DL (ref 31.5–35.7)
MCV RBC AUTO: 92 FL (ref 79–97)
MIS SERPL-MCNC: 3.87 NG/ML
MONOCYTES # BLD AUTO: 0.3 X10E3/UL (ref 0.1–0.9)
MONOCYTES NFR BLD AUTO: 7 %
NEUTROPHILS # BLD AUTO: 2.8 X10E3/UL (ref 1.4–7)
NEUTROPHILS NFR BLD AUTO: 57 %
PATH INTERP SPEC-IMP: ABNORMAL
PLATELET # BLD AUTO: 212 X10E3/UL (ref 150–450)
POTASSIUM SERPL-SCNC: 3.9 MMOL/L (ref 3.5–5.2)
PROT SERPL-MCNC: 7 G/DL (ref 6–8.5)
RBC # BLD AUTO: 3.91 X10E6/UL (ref 3.77–5.28)
SODIUM SERPL-SCNC: 140 MMOL/L (ref 134–144)
STAT OF ADQ CVX/VAG CYTO-IMP: ABNORMAL
T3FREE SERPL-MCNC: 3.3 PG/ML (ref 2–4.4)
T3RU NFR SERPL: 29 % (ref 24–39)
T4 FREE SERPL-MCNC: 1.31 NG/DL (ref 0.82–1.77)
TRIGL SERPL-MCNC: 68 MG/DL (ref 0–149)
TSH SERPL DL<=0.005 MIU/L-ACNC: 3.67 UIU/ML (ref 0.45–4.5)
VLDLC SERPL CALC-MCNC: 13 MG/DL (ref 5–40)
WBC # BLD AUTO: 4.8 X10E3/UL (ref 3.4–10.8)

## 2025-03-06 DIAGNOSIS — R11.0 NAUSEA: ICD-10-CM

## 2025-03-06 RX ORDER — ONDANSETRON 4 MG/1
4 TABLET, ORALLY DISINTEGRATING ORAL 3 TIMES DAILY PRN
Qty: 30 TABLET | Refills: 2 | Status: SHIPPED | OUTPATIENT
Start: 2025-03-06

## 2025-03-06 NOTE — TELEPHONE ENCOUNTER
Requested Prescriptions     Pending Prescriptions Disp Refills    ondansetron (ZOFRAN-ODT) 4 MG disintegrating tablet [Pharmacy Med Name: ONDANSETRON 4 MG ODT TAB 4 Tablet] 30 tablet 2     Sig: Take 1 tablet by mouth 3 times daily as needed for Nausea or Vomiting

## 2025-04-03 NOTE — TELEPHONE ENCOUNTER
Received fax from pharmacy requesting refill on pts medication(s). Pt was last seen in office on Visit date not found  and has a follow up scheduled for 4/3/2025. Will send request to Dr. White  for authorization.     Requested Prescriptions     Pending Prescriptions Disp Refills    semaglutide, 2 MG/DOSE, (OZEMPIC) 8 MG/3ML SOPN sc injection 3 mL 0     Sig: Inject 2 mg into the skin every 7 days      Patient is requesting increased doseage.

## 2025-04-21 DIAGNOSIS — E66.3 OVERWEIGHT: Primary | ICD-10-CM

## 2025-05-30 DIAGNOSIS — R11.0 NAUSEA: ICD-10-CM

## 2025-05-30 NOTE — TELEPHONE ENCOUNTER
Received fax from pharmacy requesting refill on pts medication(s). Pt was last seen in office on 8/27/2024  and has a follow up scheduled for Visit date not found. Will send request to  Dr. White[place]  for patient.     Requested Prescriptions     Pending Prescriptions Disp Refills    ondansetron (ZOFRAN-ODT) 4 MG disintegrating tablet 30 tablet 2     Sig: Take 1 tablet by mouth 3 times daily as needed for Nausea or Vomiting

## 2025-06-02 RX ORDER — ONDANSETRON 4 MG/1
4 TABLET, ORALLY DISINTEGRATING ORAL 3 TIMES DAILY PRN
Qty: 30 TABLET | Refills: 2 | Status: SHIPPED | OUTPATIENT
Start: 2025-06-02

## 2025-06-20 DIAGNOSIS — E66.3 OVERWEIGHT: ICD-10-CM

## 2025-06-23 RX ORDER — TIRZEPATIDE 10 MG/.5ML
INJECTION, SOLUTION SUBCUTANEOUS
Qty: 2 ML | Refills: 2 | Status: SHIPPED | OUTPATIENT
Start: 2025-06-23

## 2025-07-29 DIAGNOSIS — R11.0 NAUSEA: ICD-10-CM

## 2025-07-29 RX ORDER — ONDANSETRON 4 MG/1
TABLET, ORALLY DISINTEGRATING ORAL
Qty: 30 TABLET | Refills: 2 | Status: SHIPPED | OUTPATIENT
Start: 2025-07-29

## 2025-07-29 NOTE — TELEPHONE ENCOUNTER
Emi Brenden called to request a refill on her medication.      Last office visit : 08/27/2024  Next office visit : Visit date not found     Requested Prescriptions     Pending Prescriptions Disp Refills    ondansetron (ZOFRAN-ODT) 4 MG disintegrating tablet [Pharmacy Med Name: ONDANSETRON 4 MG ODT TAB 4 Tablet] 30 tablet 2     Sig: TAKE ONE TABLET BY MOUTH THREE TIMES DAILY AS NEEDED FOR NAUSEA            Shana Sharp RN

## 2025-09-02 DIAGNOSIS — E66.3 OVERWEIGHT: ICD-10-CM

## 2025-09-02 DIAGNOSIS — R11.0 NAUSEA: ICD-10-CM

## 2025-09-03 RX ORDER — ONDANSETRON 4 MG/1
4 TABLET, ORALLY DISINTEGRATING ORAL EVERY 8 HOURS PRN
Qty: 30 TABLET | Refills: 2 | Status: SHIPPED | OUTPATIENT
Start: 2025-09-03

## 2025-09-03 RX ORDER — TIRZEPATIDE 10 MG/.5ML
10 INJECTION, SOLUTION SUBCUTANEOUS WEEKLY
Qty: 2 ML | Refills: 2 | Status: SHIPPED | OUTPATIENT
Start: 2025-09-03